# Patient Record
Sex: FEMALE | Race: WHITE | NOT HISPANIC OR LATINO
[De-identification: names, ages, dates, MRNs, and addresses within clinical notes are randomized per-mention and may not be internally consistent; named-entity substitution may affect disease eponyms.]

---

## 2020-04-23 PROBLEM — Z00.00 ENCOUNTER FOR PREVENTIVE HEALTH EXAMINATION: Status: ACTIVE | Noted: 2020-04-23

## 2020-04-28 ENCOUNTER — APPOINTMENT (OUTPATIENT)
Dept: VASCULAR SURGERY | Facility: CLINIC | Age: 80
End: 2020-04-28
Payer: MEDICARE

## 2020-04-28 ENCOUNTER — LABORATORY RESULT (OUTPATIENT)
Age: 80
End: 2020-04-28

## 2020-04-28 ENCOUNTER — OUTPATIENT (OUTPATIENT)
Dept: OUTPATIENT SERVICES | Facility: HOSPITAL | Age: 80
LOS: 1 days | End: 2020-04-28
Payer: COMMERCIAL

## 2020-04-28 DIAGNOSIS — Z01.818 ENCOUNTER FOR OTHER PREPROCEDURAL EXAMINATION: ICD-10-CM

## 2020-04-28 LAB
ALBUMIN SERPL ELPH-MCNC: 4.5 G/DL — SIGNIFICANT CHANGE UP (ref 3.3–5)
ALP SERPL-CCNC: 98 U/L — SIGNIFICANT CHANGE UP (ref 40–120)
ALT FLD-CCNC: 22 U/L — SIGNIFICANT CHANGE UP (ref 10–45)
ANION GAP SERPL CALC-SCNC: 12 MMOL/L — SIGNIFICANT CHANGE UP (ref 5–17)
APPEARANCE UR: CLEAR — SIGNIFICANT CHANGE UP
APTT BLD: 32 SEC — SIGNIFICANT CHANGE UP (ref 27.5–36.3)
AST SERPL-CCNC: 19 U/L — SIGNIFICANT CHANGE UP (ref 10–40)
BACTERIA # UR AUTO: SIGNIFICANT CHANGE UP /HPF
BASOPHILS # BLD AUTO: 0.11 K/UL — SIGNIFICANT CHANGE UP (ref 0–0.2)
BASOPHILS NFR BLD AUTO: 1.2 % — SIGNIFICANT CHANGE UP (ref 0–2)
BILIRUB SERPL-MCNC: 0.5 MG/DL — SIGNIFICANT CHANGE UP (ref 0.2–1.2)
BILIRUB UR-MCNC: NEGATIVE — SIGNIFICANT CHANGE UP
BUN SERPL-MCNC: 20 MG/DL — SIGNIFICANT CHANGE UP (ref 7–23)
CALCIUM SERPL-MCNC: 10.8 MG/DL — HIGH (ref 8.4–10.5)
CHLORIDE SERPL-SCNC: 106 MMOL/L — SIGNIFICANT CHANGE UP (ref 96–108)
CO2 SERPL-SCNC: 28 MMOL/L — SIGNIFICANT CHANGE UP (ref 22–31)
COLOR SPEC: YELLOW — SIGNIFICANT CHANGE UP
CREAT SERPL-MCNC: 0.78 MG/DL — SIGNIFICANT CHANGE UP (ref 0.5–1.3)
DIFF PNL FLD: NEGATIVE — SIGNIFICANT CHANGE UP
EOSINOPHIL # BLD AUTO: 0.26 K/UL — SIGNIFICANT CHANGE UP (ref 0–0.5)
EOSINOPHIL NFR BLD AUTO: 2.8 % — SIGNIFICANT CHANGE UP (ref 0–6)
EPI CELLS # UR: SIGNIFICANT CHANGE UP /HPF (ref 0–5)
GLUCOSE SERPL-MCNC: 104 MG/DL — HIGH (ref 70–99)
GLUCOSE UR QL: NEGATIVE — SIGNIFICANT CHANGE UP
HCT VFR BLD CALC: 45.7 % — HIGH (ref 34.5–45)
HGB BLD-MCNC: 14.1 G/DL — SIGNIFICANT CHANGE UP (ref 11.5–15.5)
IMM GRANULOCYTES NFR BLD AUTO: 0.3 % — SIGNIFICANT CHANGE UP (ref 0–1.5)
INR BLD: 1.03 — SIGNIFICANT CHANGE UP (ref 0.88–1.16)
KETONES UR-MCNC: NEGATIVE — SIGNIFICANT CHANGE UP
LEUKOCYTE ESTERASE UR-ACNC: ABNORMAL
LYMPHOCYTES # BLD AUTO: 2.21 K/UL — SIGNIFICANT CHANGE UP (ref 1–3.3)
LYMPHOCYTES # BLD AUTO: 23.5 % — SIGNIFICANT CHANGE UP (ref 13–44)
MCHC RBC-ENTMCNC: 29 PG — SIGNIFICANT CHANGE UP (ref 27–34)
MCHC RBC-ENTMCNC: 30.9 GM/DL — LOW (ref 32–36)
MCV RBC AUTO: 94 FL — SIGNIFICANT CHANGE UP (ref 80–100)
MONOCYTES # BLD AUTO: 0.79 K/UL — SIGNIFICANT CHANGE UP (ref 0–0.9)
MONOCYTES NFR BLD AUTO: 8.4 % — SIGNIFICANT CHANGE UP (ref 2–14)
NEUTROPHILS # BLD AUTO: 6.02 K/UL — SIGNIFICANT CHANGE UP (ref 1.8–7.4)
NEUTROPHILS NFR BLD AUTO: 63.8 % — SIGNIFICANT CHANGE UP (ref 43–77)
NITRITE UR-MCNC: NEGATIVE — SIGNIFICANT CHANGE UP
NRBC # BLD: 0 /100 WBCS — SIGNIFICANT CHANGE UP (ref 0–0)
PH UR: 6 — SIGNIFICANT CHANGE UP (ref 5–8)
PLATELET # BLD AUTO: 267 K/UL — SIGNIFICANT CHANGE UP (ref 150–400)
POTASSIUM SERPL-MCNC: 5.1 MMOL/L — SIGNIFICANT CHANGE UP (ref 3.5–5.3)
POTASSIUM SERPL-SCNC: 5.1 MMOL/L — SIGNIFICANT CHANGE UP (ref 3.5–5.3)
PROT SERPL-MCNC: 7.3 G/DL — SIGNIFICANT CHANGE UP (ref 6–8.3)
PROT UR-MCNC: NEGATIVE MG/DL — SIGNIFICANT CHANGE UP
PROTHROM AB SERPL-ACNC: 11.7 SEC — SIGNIFICANT CHANGE UP (ref 10–12.9)
RBC # BLD: 4.86 M/UL — SIGNIFICANT CHANGE UP (ref 3.8–5.2)
RBC # FLD: 13.1 % — SIGNIFICANT CHANGE UP (ref 10.3–14.5)
RBC CASTS # UR COMP ASSIST: < 5 /HPF — SIGNIFICANT CHANGE UP
SODIUM SERPL-SCNC: 146 MMOL/L — HIGH (ref 135–145)
SP GR SPEC: <=1.005 — SIGNIFICANT CHANGE UP (ref 1–1.03)
UROBILINOGEN FLD QL: 0.2 E.U./DL — SIGNIFICANT CHANGE UP
WBC # BLD: 9.42 K/UL — SIGNIFICANT CHANGE UP (ref 3.8–10.5)
WBC # FLD AUTO: 9.42 K/UL — SIGNIFICANT CHANGE UP (ref 3.8–10.5)
WBC UR QL: < 5 /HPF — SIGNIFICANT CHANGE UP

## 2020-04-28 PROCEDURE — 87086 URINE CULTURE/COLONY COUNT: CPT

## 2020-04-28 PROCEDURE — 85025 COMPLETE CBC W/AUTO DIFF WBC: CPT

## 2020-04-28 PROCEDURE — 93923 UPR/LXTR ART STDY 3+ LVLS: CPT

## 2020-04-28 PROCEDURE — 99205 OFFICE O/P NEW HI 60 MIN: CPT

## 2020-04-28 PROCEDURE — 93005 ELECTROCARDIOGRAM TRACING: CPT

## 2020-04-28 PROCEDURE — 81001 URINALYSIS AUTO W/SCOPE: CPT

## 2020-04-28 PROCEDURE — 80053 COMPREHEN METABOLIC PANEL: CPT

## 2020-04-28 PROCEDURE — 93010 ELECTROCARDIOGRAM REPORT: CPT

## 2020-04-28 PROCEDURE — 85730 THROMBOPLASTIN TIME PARTIAL: CPT

## 2020-04-28 PROCEDURE — 85610 PROTHROMBIN TIME: CPT

## 2020-04-28 NOTE — PHYSICAL EXAM
[2+] : right 2+ [JVD] : no jugular venous distention  [0] : left 0 [1+] : right 1+ [Alert] : alert [Calm] : calm [de-identified] : .5x.4cm ulcer on medial left heel.  0.3x0.3cm ulcer on plantar 5th toe [de-identified] : pleasant

## 2020-04-28 NOTE — REVIEW OF SYSTEMS
[Leg Claudication] : intermittent leg claudication [Skin Wound] : skin wound [Confused] : confusion [Negative] : Heme/Lymph

## 2020-04-28 NOTE — HISTORY OF PRESENT ILLNESS
[FreeTextEntry1] : pt comes in with her son for painful ulcer on the left heel\par the wound has been there for the past month and she was seen by surgeon in [place] who advised her to walk and no intervention .\par she also has a wound on the 5th toe on the left that has not been healing as well\par she is unable to walk and she is using the wheelchair\par \par no hx of DM,\par she is hypertensive\par

## 2020-04-28 NOTE — VITALS
[Stabbing] : stabbing [FreeTextEntry3] : left heel [FreeTextEntry1] : rest [FreeTextEntry2] : walking

## 2020-04-29 LAB
CULTURE RESULTS: SIGNIFICANT CHANGE UP
SPECIMEN SOURCE: SIGNIFICANT CHANGE UP

## 2020-05-01 VITALS
HEART RATE: 67 BPM | OXYGEN SATURATION: 95 % | TEMPERATURE: 98 F | DIASTOLIC BLOOD PRESSURE: 68 MMHG | RESPIRATION RATE: 16 BRPM | WEIGHT: 195.99 LBS | SYSTOLIC BLOOD PRESSURE: 103 MMHG

## 2020-05-02 ENCOUNTER — LABORATORY RESULT (OUTPATIENT)
Age: 80
End: 2020-05-02

## 2020-05-03 ENCOUNTER — FORM ENCOUNTER (OUTPATIENT)
Age: 80
End: 2020-05-03

## 2020-05-04 ENCOUNTER — OUTPATIENT (OUTPATIENT)
Dept: OUTPATIENT SERVICES | Facility: HOSPITAL | Age: 80
LOS: 1 days | Discharge: HOME | End: 2020-05-04
Payer: MEDICARE

## 2020-05-04 ENCOUNTER — APPOINTMENT (OUTPATIENT)
Dept: VASCULAR SURGERY | Facility: HOSPITAL | Age: 80
End: 2020-05-04

## 2020-05-04 VITALS — RESPIRATION RATE: 22 BRPM | OXYGEN SATURATION: 99 % | HEART RATE: 77 BPM

## 2020-05-04 DIAGNOSIS — S82.899A OTHER FRACTURE OF UNSPECIFIED LOWER LEG, INITIAL ENCOUNTER FOR CLOSED FRACTURE: Chronic | ICD-10-CM

## 2020-05-04 PROBLEM — I10 ESSENTIAL (PRIMARY) HYPERTENSION: Chronic | Status: ACTIVE | Noted: 2020-05-01

## 2020-05-04 PROBLEM — E07.9 DISORDER OF THYROID, UNSPECIFIED: Chronic | Status: ACTIVE | Noted: 2020-05-01

## 2020-05-04 PROBLEM — I73.9 PERIPHERAL VASCULAR DISEASE, UNSPECIFIED: Chronic | Status: ACTIVE | Noted: 2020-05-01

## 2020-05-04 PROCEDURE — 36247 INS CATH ABD/L-EXT ART 3RD: CPT | Mod: LT

## 2020-05-04 PROCEDURE — 75625 CONTRAST EXAM ABDOMINL AORTA: CPT | Mod: 26,GC

## 2020-05-04 PROCEDURE — 99204 OFFICE O/P NEW MOD 45 MIN: CPT

## 2020-05-04 PROCEDURE — C1769: CPT

## 2020-05-04 PROCEDURE — 36247 INS CATH ABD/L-EXT ART 3RD: CPT | Mod: GC

## 2020-05-04 PROCEDURE — C1887: CPT

## 2020-05-04 PROCEDURE — 93306 TTE W/DOPPLER COMPLETE: CPT | Mod: 26

## 2020-05-04 PROCEDURE — 76000 FLUOROSCOPY <1 HR PHYS/QHP: CPT

## 2020-05-04 PROCEDURE — 93970 EXTREMITY STUDY: CPT

## 2020-05-04 PROCEDURE — 93306 TTE W/DOPPLER COMPLETE: CPT

## 2020-05-04 PROCEDURE — 75710 ARTERY X-RAYS ARM/LEG: CPT | Mod: 26,GC

## 2020-05-04 PROCEDURE — 93970 EXTREMITY STUDY: CPT | Mod: 26

## 2020-05-04 PROCEDURE — C1894: CPT

## 2020-05-04 RX ORDER — SODIUM CHLORIDE 9 MG/ML
1000 INJECTION INTRAMUSCULAR; INTRAVENOUS; SUBCUTANEOUS
Refills: 0 | Status: DISCONTINUED | OUTPATIENT
Start: 2020-05-04 | End: 2020-05-04

## 2020-05-04 RX ORDER — ASPIRIN/CALCIUM CARB/MAGNESIUM 324 MG
81 TABLET ORAL DAILY
Refills: 0 | Status: DISCONTINUED | OUTPATIENT
Start: 2020-05-04 | End: 2020-05-04

## 2020-05-04 RX ADMIN — Medication 81 MILLIGRAM(S): at 13:11

## 2020-05-04 RX ADMIN — SODIUM CHLORIDE 90 MILLILITER(S): 9 INJECTION INTRAMUSCULAR; INTRAVENOUS; SUBCUTANEOUS at 09:07

## 2020-05-04 NOTE — PROGRESS NOTE ADULT - SUBJECTIVE AND OBJECTIVE BOX
Surgery Post-Op Note    Pre-Op Dx: PAD    Procedure: Angiogram, extremity, left    Surgeon: Dr. Fischer    Subjective: Examined at the bedside. Resting supine and flat. Denies cp, sob, LE pain or numbness. No acute complaints.      Vital Signs Last 24 Hrs  T(C): 36.1 (04 May 2020 08:58), Max: 36.1 (04 May 2020 08:58)  T(F): 97 (04 May 2020 08:58), Max: 97 (04 May 2020 08:58)  HR: 66 (04 May 2020 13:15) (53 - 66)  BP: 125/59 (04 May 2020 13:00) (107/53 - 125/59)  BP(mean): 85 (04 May 2020 13:00) (79 - 85)  RR: 20 (04 May 2020 13:15) (15 - 32)  SpO2: 100% (04 May 2020 13:15) (100% - 100%)    Physical Exam:  General: NAD, resting comfortably in bed  Pulmonary: Nonlabored breathing, no respiratory distress  Cardiovascular: NSR  Abdominal: soft, non-tender, non-distended  Extremities: WWP,   Groin: soft and flat, no palpable masses or hematoma noted  Neuro: no focal deficits, normal sensation  Pulses: LLE: DP monophasic, PT no signal, RLE-biphasic DP and PTsignals      LABS:            CAPILLARY BLOOD GLUCOSE                  Radiology and Additional Studies:

## 2020-05-04 NOTE — CONSULT NOTE ADULT - SUBJECTIVE AND OBJECTIVE BOX
Patient is an 79 yo Female with PMhx of PAD, HTN, admitted to the vascular service for evaluation and treatment of a Left Heal Ischemic Ulcer. Cardiology is being consulted for Pre-Op clearance prior to Scheduled Vascular LFSA Bypass surgery.    PAST MEDICAL & SURGICAL HISTORY:  Thyroid disease  HTN (hypertension)  PAD (peripheral artery disease): ischemic ulcer  Ankle fracture      Home Medications:  Olmesartan 40  HCTZ 12.5      MEDICATIONS  (STANDING):  aspirin  chewable 81 milliGRAM(s) Oral daily  sodium chloride 0.9%. 1000 milliLiter(s) (90 mL/Hr) IV Continuous <Continuous>    MEDICATIONS  (PRN):      .  VITAL SIGNS:  T(C): 36.1 (05-04-20 @ 08:58), Max: 36.1 (05-04-20 @ 08:58)  T(F): 97 (05-04-20 @ 08:58), Max: 97 (05-04-20 @ 08:58)  HR: 62 (05-04-20 @ 09:49) (53 - 62)  BP: 115/59 (05-04-20 @ 09:49) (107/53 - 120/58)  BP(mean): 84 (05-04-20 @ 09:49) (81 - 84)  RR: 20 (05-04-20 @ 09:49) (15 - 32)  SpO2: 100% (05-04-20 @ 09:49) (100% - 100%)  Wt(kg): --    PHYSICAL EXAM:   INCOMPLETE    Constitutional: WDWN resting comfortably in bed; NAD  Head: NC/AT  Eyes: PERRL, EOMI, anicteric sclera  ENT: no nasal discharge; uvula midline, no oropharyngeal erythema or exudates; MMM  Neck: supple; no JVD or thyromegaly  Respiratory: CTA B/L; no W/R/R, no retractions  Cardiac: +S1/S2; RRR; no M/R/G; PMI non-displaced  Gastrointestinal: soft, NT/ND; no rebound or guarding; +BSx4  Genitourinary: normal external genitalia  Back: spine midline, no bony tenderness or step-offs; no CVAT B/L  Extremities: WWP, no clubbing or cyanosis; no peripheral edema  Musculoskeletal: NROM x4; no joint swelling, tenderness or erythema  Vascular: 2+ radial, femoral, DP/PT pulses B/L  Dermatologic: skin warm, dry and intact; no rashes, wounds, or scars  Lymphatic: no submandibular or cervical LAD  Neurologic: AAOx3; CNII-XII grossly intact; no focal deficits  Psychiatric: affect and characteristics of appearance, verbalizations, behaviors are appropriate      .  .  LABS:         RADIOLOGY, EKG & ADDITIONAL TESTS: Reviewed.     < from: 12 Lead ECG (04.28.20 @ 12:50) >  Diagnosis Line Normal sinus rhythm  Possible anteroseptal wall MI  RSR' in V1    < end of copied text > Patient is an 79 yo Female with PMhx of PAD, HTN, admitted to the vascular service for evaluation and treatment of a Left Heal Ischemic Ulcer. Cardiology is being consulted for Pre-Op clearance prior to Scheduled Vascular LFSA Bypass surgery.    Patient reports that aside from her HTN she has no known medical history. Up until 3 years ago she and her  were able to go on 5 miles walks with friends and well has long bike rides. She states that she has excellent exercise tolerance and has never suffered from Angina, chest pain. SHe denies previous MI or cardiac workup by primary Physician. No echo or stress test.   Patient states that recently in the last month her ambulation began to diminish to due her ankle pain. She was evaluated and recommend to exercise therapy. Her pain got worse at [facility] point she sought a second opinion    PAST MEDICAL & SURGICAL HISTORY:  Thyroid disease  HTN (hypertension)  PAD (peripheral artery disease): ischemic ulcer  Ankle fracture      Home Medications:  Olmesartan 40  HCTZ 12.5      MEDICATIONS  (STANDING):  aspirin  chewable 81 milliGRAM(s) Oral daily  sodium chloride 0.9%. 1000 milliLiter(s) (90 mL/Hr) IV Continuous <Continuous>    MEDICATIONS  (PRN):      .  VITAL SIGNS:  T(C): 36.1 (05-04-20 @ 08:58), Max: 36.1 (05-04-20 @ 08:58)  T(F): 97 (05-04-20 @ 08:58), Max: 97 (05-04-20 @ 08:58)  HR: 62 (05-04-20 @ 09:49) (53 - 62)  BP: 115/59 (05-04-20 @ 09:49) (107/53 - 120/58)  BP(mean): 84 (05-04-20 @ 09:49) (81 - 84)  RR: 20 (05-04-20 @ 09:49) (15 - 32)  SpO2: 100% (05-04-20 @ 09:49) (100% - 100%)  Wt(kg): --    PHYSICAL EXAM:       Constitutional: WDWN resting comfortably in bed; NAD: About to be discharged  Head: NC/AT  Eyes: PERRL, EOMI, anicteric sclera  ENT:  MMM  Neck: supple; no JVD or thyromegaly  Respiratory: CTA B/L; no W/R/R  Cardiac: +S1/S2; RRR; no M/R/G;   Gastrointestinal: soft, NT/ND; no rebound or guarding; +BS  Extremities: WWP, no clubbing or cyanosis; no peripheral edema  Vascular: 2+ radial, femoral, DP/PT pulses B/L  Neurologic: AAOx3; CNII-XII grossly intact; no focal deficits      LABS:   -----      RADIOLOGY, EKG & ADDITIONAL TESTS: Reviewed.     < from: 12 Lead ECG (04.28.20 @ 12:50) >  Diagnosis Line Normal sinus rhythm  Possible anteroseptal wall MI  RSR' in V1    < end of copied text >      < from: TTE Echo Complete w/o contrast w/ Doppler (05.04.20 @ 11:31) >  . Normal left and right ventricular size and systolic function.   2. Grade II left ventricular diastolic dysfunction with elevated filling pressure.   3. Mild symmetric left ventricular hypertrophy.   4. Aorticsclerosis without significant stenosis.   5. Pulmonary hypertension present, pulmonary artery systolic pressure is 37 mmHg.   6. No pericardial effusion.    < end of copied text > Patient is an 79 yo Female with PMhx of PAD, HTN, admitted to the vascular service for evaluation and treatment of a Left Heal Ischemic Ulcer. Cardiology is being consulted for Pre-Op clearance prior to Scheduled Vascular LFSA Bypass surgery.    Patient reports that aside from her HTN she has no known medical history. Up until 3 years ago she and her  were able to go on 5 miles walks with friends and well has long bike rides. She states that she has excellent exercise tolerance and has never suffered from Angina, chest pain. SHe denies previous MI or cardiac workup by primary Physician. No echo or stress test.   Patient states that recently in the last month her ambulation began to diminish to due her ankle pain. She was evaluated and recommend to exercise therapy. Her pain got worse at [facility] point she sought a second opinion    PAST MEDICAL & SURGICAL HISTORY:  Thyroid disease  HTN (hypertension)  PAD (peripheral artery disease): ischemic ulcer  Ankle fracture      Home Medications:  Olmesartan 40  HCTZ 12.5      MEDICATIONS  (STANDING):  aspirin  chewable 81 milliGRAM(s) Oral daily  sodium chloride 0.9%. 1000 milliLiter(s) (90 mL/Hr) IV Continuous <Continuous>    MEDICATIONS  (PRN):      .  VITAL SIGNS:  T(C): 36.1 (05-04-20 @ 08:58), Max: 36.1 (05-04-20 @ 08:58)  T(F): 97 (05-04-20 @ 08:58), Max: 97 (05-04-20 @ 08:58)  HR: 62 (05-04-20 @ 09:49) (53 - 62)  BP: 115/59 (05-04-20 @ 09:49) (107/53 - 120/58)  BP(mean): 84 (05-04-20 @ 09:49) (81 - 84)  RR: 20 (05-04-20 @ 09:49) (15 - 32)  SpO2: 100% (05-04-20 @ 09:49) (100% - 100%)  Wt(kg): --    PHYSICAL EXAM:       Constitutional: WDWN resting comfortably in bed; NAD: About to be discharged  Head: NC/AT  Eyes: PERRL, EOMI, anicteric sclera  ENT:  MMM  Neck: supple; no JVD or thyromegaly  Respiratory: CTA B/L; no W/R/R  Cardiac: +S1/S2; RRR; no M/R/G;   Gastrointestinal: soft, NT/ND; no rebound or guarding; +BS  Extremities: WWP, no peripheral edema on the RLE with preserved DP/PT pulses. LLE wrapped   Vascular: 2+ radial,        LABS:   -----      RADIOLOGY, EKG & ADDITIONAL TESTS: Reviewed.     < from: 12 Lead ECG (04.28.20 @ 12:50) >  Diagnosis Line Normal sinus rhythm  Possible anteroseptal wall MI  RSR' in V1    < end of copied text >      < from: TTE Echo Complete w/o contrast w/ Doppler (05.04.20 @ 11:31) >  . Normal left and right ventricular size and systolic function.   2. Grade II left ventricular diastolic dysfunction with elevated filling pressure.   3. Mild symmetric left ventricular hypertrophy.   4. Aorticsclerosis without significant stenosis.   5. Pulmonary hypertension present, pulmonary artery systolic pressure is 37 mmHg.   6. No pericardial effusion.    < end of copied text >

## 2020-05-04 NOTE — BRIEF OPERATIVE NOTE - OPERATION/FINDINGS
Diagnostic left lower extremity angiogram:  - 5F sheath access into R CFA  - aortogram showed patent aorta and patent b/l EZRA/IIA/EIA  - catheterized left EIA with omniflush  - LLE angio showed patent CFA/PFA, proximal/mid SFA patent, distal SFA/AK pop occluded, BK pop with mild atherosclerotic disease  - proximal L AT/TP trunk/peroneal/PT arteries severely diseased, all occluded shortly after origin. AT reconstitutes in distal lower leg into DP artery  - diagnostic procedure only, no intervention  - 80 cc contrast

## 2020-05-04 NOTE — PROGRESS NOTE ADULT - ASSESSMENT
Assessment: 80y woman s/p Angiogram.    Plan:  Pain/nausea control PRN  Diet: Regular /IVF  Home meds  Incentive spirometer/OOB/Ambulate  Discharge after cardiology clearance  Return for OR on Thursday

## 2020-05-04 NOTE — CONSULT NOTE ADULT - ASSESSMENT
79 yo Female with PMhx of PAD, HTN, admitted to the vascular service for evaluation and treatment of a Left Heal Ischemic Ulcer. Cardiology is being consulted for Pre-Op clearance prior to Scheduled Vascular LFSA Bypass surgery      1) Pre-OP Clearance  -Patient with HTN and newly Atherosclerotic Cardiovascular Disease (PAD) with left heal ischemic Ulcer  -She has no known Ischemic Heart Disease, Hx of Congestive Heart Failure, CVD, DM. Outpatient Cr is normal   -EKG with Septal Q waves, cannot rule out prior Septal Infarct, without evidence of Ischemic changes otherwise,  Echo this hospitalization without any wall motion abnormalities, Grade II left ventricular diastolic dysfunction with elevated filling pressure and mild LVH  -Recommend sending Lipid profile, TSH and A1C  -Patient would benefit from High dose statin given PAD (Lipitor 40 Given age).   -Would recommend Initiation of Aspirin 81 mg given PAD.  -Can continue with Olmesartan 40 mg (equivalent of Losartan 100 mg) and Hctz 12.5 PO.   -Given planned surgery will involved contrast, would advise to Hold Olmesartan 24 hour prior to intervention. Should BP control be required at the time would recommend Norvasc  -Patient's RCRI score is a class II, with a 6%-30 day risk of Death, MI or Cardiac Arrest  -No further cardiac testing is needed prior to urgent LLE BYpass vascular surgery  -Please call cardiology with any questions

## 2020-05-04 NOTE — BRIEF OPERATIVE NOTE - COMMENTS
At end of case, sheath removed from R groin, and hemostasis achieved after 20 minutes of manual compression. Groin soft, no hematoma.  Strict flat bedrest until 1 pm.  Post op: get echo, vein map, cards consult for clearance before LLE bypass

## 2020-05-06 ENCOUNTER — LABORATORY RESULT (OUTPATIENT)
Age: 80
End: 2020-05-06

## 2020-05-06 ENCOUNTER — TRANSCRIPTION ENCOUNTER (OUTPATIENT)
Age: 80
End: 2020-05-06

## 2020-05-06 VITALS
OXYGEN SATURATION: 96 % | DIASTOLIC BLOOD PRESSURE: 77 MMHG | WEIGHT: 196.87 LBS | HEIGHT: 63 IN | RESPIRATION RATE: 16 BRPM | HEART RATE: 80 BPM | SYSTOLIC BLOOD PRESSURE: 123 MMHG | TEMPERATURE: 98 F

## 2020-05-06 NOTE — PATIENT PROFILE ADULT - DISASTER - NSPROIMPLANTSMEDDEV_GEN_A_NUR
Increase fluids  NSAIDs prn  Heating pad prn    RTC if pain worsening or in 1-2 weeks for OMT   None

## 2020-05-07 ENCOUNTER — INPATIENT (INPATIENT)
Facility: HOSPITAL | Age: 80
LOS: 3 days | Discharge: NURSING, REHAB OR LONG-TERM CARE | DRG: 253 | End: 2020-05-11
Attending: SURGERY | Admitting: SURGERY
Payer: MEDICARE

## 2020-05-07 ENCOUNTER — APPOINTMENT (OUTPATIENT)
Dept: VASCULAR SURGERY | Facility: HOSPITAL | Age: 80
End: 2020-05-07

## 2020-05-07 DIAGNOSIS — S82.899A OTHER FRACTURE OF UNSPECIFIED LOWER LEG, INITIAL ENCOUNTER FOR CLOSED FRACTURE: Chronic | ICD-10-CM

## 2020-05-07 LAB
ANION GAP SERPL CALC-SCNC: 13 MMOL/L — SIGNIFICANT CHANGE UP (ref 5–17)
APTT BLD: 31.2 SEC — SIGNIFICANT CHANGE UP (ref 27.5–36.3)
BUN SERPL-MCNC: 17 MG/DL — SIGNIFICANT CHANGE UP (ref 7–23)
CALCIUM SERPL-MCNC: 9.5 MG/DL — SIGNIFICANT CHANGE UP (ref 8.4–10.5)
CHLORIDE SERPL-SCNC: 109 MMOL/L — HIGH (ref 96–108)
CO2 SERPL-SCNC: 21 MMOL/L — LOW (ref 22–31)
CREAT SERPL-MCNC: 0.62 MG/DL — SIGNIFICANT CHANGE UP (ref 0.5–1.3)
GLUCOSE SERPL-MCNC: 123 MG/DL — HIGH (ref 70–99)
HCT VFR BLD CALC: 36.1 % — SIGNIFICANT CHANGE UP (ref 34.5–45)
HGB BLD-MCNC: 11.8 G/DL — SIGNIFICANT CHANGE UP (ref 11.5–15.5)
INR BLD: 1.08 — SIGNIFICANT CHANGE UP (ref 0.88–1.16)
MAGNESIUM SERPL-MCNC: 2.2 MG/DL — SIGNIFICANT CHANGE UP (ref 1.6–2.6)
MCHC RBC-ENTMCNC: 29.6 PG — SIGNIFICANT CHANGE UP (ref 27–34)
MCHC RBC-ENTMCNC: 32.7 GM/DL — SIGNIFICANT CHANGE UP (ref 32–36)
MCV RBC AUTO: 90.7 FL — SIGNIFICANT CHANGE UP (ref 80–100)
NRBC # BLD: 0 /100 WBCS — SIGNIFICANT CHANGE UP (ref 0–0)
PHOSPHATE SERPL-MCNC: 3.9 MG/DL — SIGNIFICANT CHANGE UP (ref 2.5–4.5)
PLATELET # BLD AUTO: 249 K/UL — SIGNIFICANT CHANGE UP (ref 150–400)
POTASSIUM SERPL-MCNC: 3.9 MMOL/L — SIGNIFICANT CHANGE UP (ref 3.5–5.3)
POTASSIUM SERPL-SCNC: 3.9 MMOL/L — SIGNIFICANT CHANGE UP (ref 3.5–5.3)
PROTHROM AB SERPL-ACNC: 12.3 SEC — SIGNIFICANT CHANGE UP (ref 10–12.9)
RBC # BLD: 3.98 M/UL — SIGNIFICANT CHANGE UP (ref 3.8–5.2)
RBC # FLD: 13.2 % — SIGNIFICANT CHANGE UP (ref 10.3–14.5)
SARS-COV-2 RNA SPEC QL NAA+PROBE: SIGNIFICANT CHANGE UP
SODIUM SERPL-SCNC: 143 MMOL/L — SIGNIFICANT CHANGE UP (ref 135–145)
WBC # BLD: 11.86 K/UL — HIGH (ref 3.8–10.5)
WBC # FLD AUTO: 11.86 K/UL — HIGH (ref 3.8–10.5)

## 2020-05-07 PROCEDURE — 35571 ART BYP POP-TIBL-PRL-OTHER: CPT | Mod: 78,GC

## 2020-05-07 PROCEDURE — 37226: CPT | Mod: 78,GC

## 2020-05-07 RX ORDER — CEFAZOLIN SODIUM 1 G
2000 VIAL (EA) INJECTION EVERY 8 HOURS
Refills: 0 | Status: COMPLETED | OUTPATIENT
Start: 2020-05-07 | End: 2020-05-08

## 2020-05-07 RX ORDER — HYDROMORPHONE HYDROCHLORIDE 2 MG/ML
1 INJECTION INTRAMUSCULAR; INTRAVENOUS; SUBCUTANEOUS EVERY 6 HOURS
Refills: 0 | Status: DISCONTINUED | OUTPATIENT
Start: 2020-05-07 | End: 2020-05-10

## 2020-05-07 RX ORDER — OXYCODONE HYDROCHLORIDE 5 MG/1
5 TABLET ORAL ONCE
Refills: 0 | Status: DISCONTINUED | OUTPATIENT
Start: 2020-05-07 | End: 2020-05-07

## 2020-05-07 RX ORDER — ACETAMINOPHEN 500 MG
325 TABLET ORAL ONCE
Refills: 0 | Status: COMPLETED | OUTPATIENT
Start: 2020-05-07 | End: 2020-05-07

## 2020-05-07 RX ORDER — OXYCODONE AND ACETAMINOPHEN 5; 325 MG/1; MG/1
1 TABLET ORAL EVERY 4 HOURS
Refills: 0 | Status: DISCONTINUED | OUTPATIENT
Start: 2020-05-07 | End: 2020-05-07

## 2020-05-07 RX ORDER — ASPIRIN/CALCIUM CARB/MAGNESIUM 324 MG
1 TABLET ORAL
Qty: 0 | Refills: 0 | DISCHARGE

## 2020-05-07 RX ORDER — ATORVASTATIN CALCIUM 80 MG/1
40 TABLET, FILM COATED ORAL AT BEDTIME
Refills: 0 | Status: DISCONTINUED | OUTPATIENT
Start: 2020-05-07 | End: 2020-05-11

## 2020-05-07 RX ORDER — POTASSIUM CHLORIDE 20 MEQ
20 PACKET (EA) ORAL ONCE
Refills: 0 | Status: COMPLETED | OUTPATIENT
Start: 2020-05-07 | End: 2020-05-07

## 2020-05-07 RX ORDER — ACETAMINOPHEN 500 MG
650 TABLET ORAL EVERY 6 HOURS
Refills: 0 | Status: DISCONTINUED | OUTPATIENT
Start: 2020-05-07 | End: 2020-05-10

## 2020-05-07 RX ORDER — SODIUM CHLORIDE 9 MG/ML
1000 INJECTION INTRAMUSCULAR; INTRAVENOUS; SUBCUTANEOUS
Refills: 0 | Status: DISCONTINUED | OUTPATIENT
Start: 2020-05-07 | End: 2020-05-08

## 2020-05-07 RX ORDER — HYDROMORPHONE HYDROCHLORIDE 2 MG/ML
0.5 INJECTION INTRAMUSCULAR; INTRAVENOUS; SUBCUTANEOUS
Refills: 0 | Status: DISCONTINUED | OUTPATIENT
Start: 2020-05-07 | End: 2020-05-10

## 2020-05-07 RX ORDER — ASPIRIN/CALCIUM CARB/MAGNESIUM 324 MG
81 TABLET ORAL DAILY
Refills: 0 | Status: DISCONTINUED | OUTPATIENT
Start: 2020-05-07 | End: 2020-05-11

## 2020-05-07 RX ORDER — ASPIRIN/CALCIUM CARB/MAGNESIUM 324 MG
81 TABLET ORAL DAILY
Refills: 0 | Status: DISCONTINUED | OUTPATIENT
Start: 2020-05-07 | End: 2020-05-07

## 2020-05-07 RX ORDER — HYDROMORPHONE HYDROCHLORIDE 2 MG/ML
0.5 INJECTION INTRAMUSCULAR; INTRAVENOUS; SUBCUTANEOUS
Refills: 0 | Status: DISCONTINUED | OUTPATIENT
Start: 2020-05-07 | End: 2020-05-11

## 2020-05-07 RX ORDER — OLMESARTAN MEDOXOMIL-HYDROCHLOROTHIAZIDE 25; 40 MG/1; MG/1
1 TABLET, FILM COATED ORAL
Qty: 0 | Refills: 0 | DISCHARGE

## 2020-05-07 RX ORDER — ACETAMINOPHEN 500 MG
650 TABLET ORAL EVERY 6 HOURS
Refills: 0 | Status: DISCONTINUED | OUTPATIENT
Start: 2020-05-07 | End: 2020-05-07

## 2020-05-07 RX ADMIN — ATORVASTATIN CALCIUM 40 MILLIGRAM(S): 80 TABLET, FILM COATED ORAL at 21:18

## 2020-05-07 RX ADMIN — Medication 100 MILLIGRAM(S): at 21:18

## 2020-05-07 RX ADMIN — Medication 81 MILLIGRAM(S): at 14:17

## 2020-05-07 RX ADMIN — Medication 20 MILLIEQUIVALENT(S): at 16:26

## 2020-05-07 RX ADMIN — SODIUM CHLORIDE 90 MILLILITER(S): 9 INJECTION INTRAMUSCULAR; INTRAVENOUS; SUBCUTANEOUS at 14:18

## 2020-05-07 RX ADMIN — OXYCODONE HYDROCHLORIDE 5 MILLIGRAM(S): 5 TABLET ORAL at 15:23

## 2020-05-07 RX ADMIN — OXYCODONE AND ACETAMINOPHEN 1 TABLET(S): 5; 325 TABLET ORAL at 14:27

## 2020-05-07 RX ADMIN — Medication 325 MILLIGRAM(S): at 15:22

## 2020-05-07 RX ADMIN — HYDROMORPHONE HYDROCHLORIDE 0.5 MILLIGRAM(S): 2 INJECTION INTRAMUSCULAR; INTRAVENOUS; SUBCUTANEOUS at 15:35

## 2020-05-07 NOTE — BRIEF OPERATIVE NOTE - NSICDXBRIEFPREOP_GEN_ALL_CORE_FT
PRE-OP DIAGNOSIS:  PAD (peripheral artery disease) 07-May-2020 14:22:08 with tissue loss Carlos Baum

## 2020-05-07 NOTE — BRIEF OPERATIVE NOTE - NSICDXBRIEFPROCEDURE_GEN_ALL_CORE_FT
PROCEDURES:  Vascular surgery 07-May-2020 14:26:42  Carlos Baum PROCEDURES:  Vascular surgery 07-May-2020 14:26:42 left SFA angioplasty/stent, left BK pop to DP bypass with Carlos Stubbs

## 2020-05-07 NOTE — PROGRESS NOTE ADULT - SUBJECTIVE AND OBJECTIVE BOX
Surgery Post-Op Note    Pre-Op Dx:   Procedure: BK pop to DP bypass and distal SFA and pop angioplasty/stent    Surgeon: Dr. Fischer    Subjective: Examined at the bedside. States she has left foot numbness after ankle block. Denies numbness and tingling of the R leg and foot, sob, cp, nausea, emesis. no acute complaints      Vital Signs Last 24 Hrs  T(C): 36.3 (07 May 2020 16:49), Max: 36.7 (07 May 2020 15:30)  T(F): 97.4 (07 May 2020 16:49), Max: 98 (07 May 2020 15:30)  HR: 71 (07 May 2020 16:49) (54 - 71)  BP: 120/92 (07 May 2020 16:49) (99/56 - 134/66)  BP(mean): 94 (07 May 2020 16:00) (73 - 94)  RR: 16 (07 May 2020 16:49) (14 - 23)  SpO2: 100% (07 May 2020 16:49) (99% - 100%)    Physical Exam:  General: NAD, resting comfortably in bed  Pulmonary: Nonlabored breathing, no respiratory distress  Cardiovascular: NSR  Abdominal: soft, non-tender, non-distended  Extremities: WWP, medial thigh dressing clean, non saturated, left foot dressing clean and dry  Neuro: no focal deficits, normal sensation of RLE, numbness of left foot  Pulses: LLE: palpable DP pulse, RLE: biphasic DP/PT pulses      LABS:                        11.8   11.86 )-----------( 249      ( 07 May 2020 14:21 )             36.1     05-07    143  |  109<H>  |  17  ----------------------------<  123<H>  3.9   |  21<L>  |  0.62    Ca    9.5      07 May 2020 14:21  Phos  3.9     05-07  Mg     2.2     05-07      PT/INR - ( 07 May 2020 14:21 )   PT: 12.3 sec;   INR: 1.08          PTT - ( 07 May 2020 14:21 )  PTT:31.2 sec  CAPILLARY BLOOD GLUCOSE            ABO Interpretation: B (05-07 @ 10:00)        Radiology and Additional Studies:    < from: US Duplex Venous Lower Ext Complete, Bilateral (05.04.20 @ 11:03) >  FINDINGS: Duplex Doppler evaluation utilizing grayscale imaging, color flow Doppler imaging, and spectral analysis of the veins of the bilaterallower extremity demonstrate no visible thrombus from the common femoral vein to the  popliteal vein. The posterior tibial and peroneal veins  are normal in appearance.   MPRESSION:   No superficial or deep venous thrombosis seen.  Lower extremity vein mapping with measurements reported in detail as above    < end of copied text >

## 2020-05-07 NOTE — BRIEF OPERATIVE NOTE - OPERATION/FINDINGS
Spinal block performed by anesthesia. Pt then placed supine.    Harvested left greater saphenous vein in an attempt to perform left femoral artery to DP artery bypass. However, only GSV in the thigh was of adequate caliber. This portion of GSV was excised and prepared.     Below-knee popliteal artery then exposed. Accessed this artery with 4F sheath then performed LLE angiogram via retrograde approach, which showed known distal SFA/above-knee popliteal occlusion with diffuse disease in anterior tibial artery (main runoff to foot). Upsized to 8F sheath. Distal SFA/above-knee popliteal artery treated with Viabahn 6 mm x 10 cm covered stent and Viabahn 7 mm x 10 cm covered stent. These stents were post-dilated with [place] 6 mm x 200 mm balloon. Completion angiogram showed resolute of prior distal SFA/AK pop occlusion with filling of below-knee popliteal artery.    We then reversed the harvested GSV and performed left below-knee popliteal to dorsalis pedis artery bypass. Completion angio showed patent anastomoses, and there was a palpable DP pulse in distal left foot at end of the case.    Hemostasis achieved, and incisions closed primarily with Nylon sutures to DP harvest site and nylons/staples to rest of all incisions. After sterile dressings applied to incisions, left heel/5th toe wounds cleansed with betadine and dressed with kerlix. Spot within center of kerlix left open to palpate left DP pulse.    Contrast: 28 cc

## 2020-05-07 NOTE — H&P ADULT - NSICDXPASTMEDICALHX_GEN_ALL_CORE_FT
PAST MEDICAL HISTORY:  HTN (hypertension)     PAD (peripheral artery disease) ischemic ulcer    Thyroid disease

## 2020-05-07 NOTE — BRIEF OPERATIVE NOTE - NSICDXBRIEFPOSTOP_GEN_ALL_CORE_FT
POST-OP DIAGNOSIS:  PAD (peripheral artery disease) 07-May-2020 14:22:39 with tissue loss Carlos Baum

## 2020-05-07 NOTE — H&P ADULT - HISTORY OF PRESENT ILLNESS
80y female here for elective LLE fem-distal bypass for CLI.   PMH HTN, PAD presenting earlier this week with painful ulceration on the left heel and 5th toe x1 month. She is currently non-ambulatory and uses wheelchair. She lives in [place] with her  but will be staying with her son in [place].     5/4: patient underwent diagnostic only, LLE angiogram via right groin access showing distal SFA/AK pop occluded, BK pop with mild atherosclerotic disease  - proximal L AT/TP trunk/peroneal/PT arteries severely diseased, all occluded shortly after origin. AT reconstitutes in distal lower leg into DP artery  L DANIELLE: 0.26  R DANIELLE: 0.80    Echo (5/4/2020): normal EF  Pre-Op Hgb: 14.1.   Pre-Op Cr: 0.78    PAST MEDICAL & SURGICAL HISTORY:  Thyroid disease  HTN (hypertension)  PAD (peripheral artery disease): ischemic ulcer  Ankle fracture      ROS: See HPI    MEDICATIONS:  Benicar 40/12.5  Aspirin      No Known Allergies    Intolerances        SOCIAL HISTORY:  Smoke: Never Smoker  EtOH: occasional    FAMILY HISTORY:      Vital Signs Last 24 Hrs  HR 80, Resp 16, /77, Temp 97.7.     PHYSICAL EXAM  Exam:   Neuro: AOX3, calm, NAD.   Gen: WD, WN, appropriately groomed.    HEENT: AT, NC  Neck: No JVD, Normal thyroid, NO carotid bruits bilaterally.   Res: Nml BS, CTAB.   CV: Normal HS, RRR, no MRG  Pulses:                     R:      L:   Brachial: 2+ / 2+  Radial:    2+ / 2+    Ankle Edema: No  Varicose Veins: No  Venous Stasis: No  Wounds: left foot - 0.5x0.5cm medial heel wound, dry necrotic with no drainage, minimal surrounding erythema. 0.25x0.25cm dry shallow necrotic ulceration on the lateral aspect of the 5th toe.   Abd: soft, ND, NT, no masses or organomegaly appreciated  Musculoskeletal: sensation grossly intact, strength 5/5, FROM bilaterally           Assessment and Plan:  80yFemale admitted electively for left lower extremity fem-distal bypass for CLI and non-healing left heel and 5th toe wound, DANIELLE 0.26    Admit to Vascular Surgery, telemetry   Pain, nausea control   NPO/IVF, advance as tolerated   continue home medications, aspirin (last taken 1 week ago)   DVT ppx  AM labs   PT eval postop  EF normal (5/4/20)  Preop Hgb 14.1, Cr 0.78

## 2020-05-08 ENCOUNTER — TRANSCRIPTION ENCOUNTER (OUTPATIENT)
Age: 80
End: 2020-05-08

## 2020-05-08 LAB
ALBUMIN SERPL ELPH-MCNC: 3.3 G/DL — SIGNIFICANT CHANGE UP (ref 3.3–5)
ALP SERPL-CCNC: 63 U/L — SIGNIFICANT CHANGE UP (ref 40–120)
ALT FLD-CCNC: 13 U/L — SIGNIFICANT CHANGE UP (ref 10–45)
ANION GAP SERPL CALC-SCNC: 12 MMOL/L — SIGNIFICANT CHANGE UP (ref 5–17)
AST SERPL-CCNC: 17 U/L — SIGNIFICANT CHANGE UP (ref 10–40)
BASOPHILS # BLD AUTO: 0.05 K/UL — SIGNIFICANT CHANGE UP (ref 0–0.2)
BASOPHILS NFR BLD AUTO: 0.5 % — SIGNIFICANT CHANGE UP (ref 0–2)
BILIRUB SERPL-MCNC: 0.5 MG/DL — SIGNIFICANT CHANGE UP (ref 0.2–1.2)
BUN SERPL-MCNC: 16 MG/DL — SIGNIFICANT CHANGE UP (ref 7–23)
CALCIUM SERPL-MCNC: 8.7 MG/DL — SIGNIFICANT CHANGE UP (ref 8.4–10.5)
CHLORIDE SERPL-SCNC: 107 MMOL/L — SIGNIFICANT CHANGE UP (ref 96–108)
CO2 SERPL-SCNC: 22 MMOL/L — SIGNIFICANT CHANGE UP (ref 22–31)
CREAT SERPL-MCNC: 0.68 MG/DL — SIGNIFICANT CHANGE UP (ref 0.5–1.3)
EOSINOPHIL # BLD AUTO: 0.1 K/UL — SIGNIFICANT CHANGE UP (ref 0–0.5)
EOSINOPHIL NFR BLD AUTO: 0.9 % — SIGNIFICANT CHANGE UP (ref 0–6)
GLUCOSE SERPL-MCNC: 122 MG/DL — HIGH (ref 70–99)
HCT VFR BLD CALC: 33.1 % — LOW (ref 34.5–45)
HGB BLD-MCNC: 10.5 G/DL — LOW (ref 11.5–15.5)
IMM GRANULOCYTES NFR BLD AUTO: 0.5 % — SIGNIFICANT CHANGE UP (ref 0–1.5)
LYMPHOCYTES # BLD AUTO: 1.65 K/UL — SIGNIFICANT CHANGE UP (ref 1–3.3)
LYMPHOCYTES # BLD AUTO: 15.2 % — SIGNIFICANT CHANGE UP (ref 13–44)
MAGNESIUM SERPL-MCNC: 1.9 MG/DL — SIGNIFICANT CHANGE UP (ref 1.6–2.6)
MCHC RBC-ENTMCNC: 29.7 PG — SIGNIFICANT CHANGE UP (ref 27–34)
MCHC RBC-ENTMCNC: 31.7 GM/DL — LOW (ref 32–36)
MCV RBC AUTO: 93.8 FL — SIGNIFICANT CHANGE UP (ref 80–100)
MONOCYTES # BLD AUTO: 1 K/UL — HIGH (ref 0–0.9)
MONOCYTES NFR BLD AUTO: 9.2 % — SIGNIFICANT CHANGE UP (ref 2–14)
NEUTROPHILS # BLD AUTO: 8.02 K/UL — HIGH (ref 1.8–7.4)
NEUTROPHILS NFR BLD AUTO: 73.7 % — SIGNIFICANT CHANGE UP (ref 43–77)
NRBC # BLD: 0 /100 WBCS — SIGNIFICANT CHANGE UP (ref 0–0)
PHOSPHATE SERPL-MCNC: 2.8 MG/DL — SIGNIFICANT CHANGE UP (ref 2.5–4.5)
PLATELET # BLD AUTO: 220 K/UL — SIGNIFICANT CHANGE UP (ref 150–400)
POTASSIUM SERPL-MCNC: 4.2 MMOL/L — SIGNIFICANT CHANGE UP (ref 3.5–5.3)
POTASSIUM SERPL-SCNC: 4.2 MMOL/L — SIGNIFICANT CHANGE UP (ref 3.5–5.3)
PROT SERPL-MCNC: 5.7 G/DL — LOW (ref 6–8.3)
RBC # BLD: 3.53 M/UL — LOW (ref 3.8–5.2)
RBC # FLD: 13.3 % — SIGNIFICANT CHANGE UP (ref 10.3–14.5)
SODIUM SERPL-SCNC: 141 MMOL/L — SIGNIFICANT CHANGE UP (ref 135–145)
WBC # BLD: 10.87 K/UL — HIGH (ref 3.8–10.5)
WBC # FLD AUTO: 10.87 K/UL — HIGH (ref 3.8–10.5)

## 2020-05-08 RX ORDER — HEPARIN SODIUM 5000 [USP'U]/ML
5000 INJECTION INTRAVENOUS; SUBCUTANEOUS EVERY 8 HOURS
Refills: 0 | Status: DISCONTINUED | OUTPATIENT
Start: 2020-05-08 | End: 2020-05-09

## 2020-05-08 RX ORDER — MAGNESIUM SULFATE 500 MG/ML
1 VIAL (ML) INJECTION ONCE
Refills: 0 | Status: COMPLETED | OUTPATIENT
Start: 2020-05-08 | End: 2020-05-08

## 2020-05-08 RX ORDER — CLOPIDOGREL BISULFATE 75 MG/1
75 TABLET, FILM COATED ORAL DAILY
Refills: 0 | Status: DISCONTINUED | OUTPATIENT
Start: 2020-05-08 | End: 2020-05-11

## 2020-05-08 RX ADMIN — HEPARIN SODIUM 5000 UNIT(S): 5000 INJECTION INTRAVENOUS; SUBCUTANEOUS at 13:08

## 2020-05-08 RX ADMIN — Medication 100 MILLIGRAM(S): at 05:54

## 2020-05-08 RX ADMIN — Medication 81 MILLIGRAM(S): at 11:05

## 2020-05-08 RX ADMIN — ATORVASTATIN CALCIUM 40 MILLIGRAM(S): 80 TABLET, FILM COATED ORAL at 22:10

## 2020-05-08 RX ADMIN — HYDROMORPHONE HYDROCHLORIDE 1 MILLIGRAM(S): 2 INJECTION INTRAMUSCULAR; INTRAVENOUS; SUBCUTANEOUS at 13:09

## 2020-05-08 RX ADMIN — Medication 650 MILLIGRAM(S): at 04:13

## 2020-05-08 RX ADMIN — HYDROMORPHONE HYDROCHLORIDE 1 MILLIGRAM(S): 2 INJECTION INTRAMUSCULAR; INTRAVENOUS; SUBCUTANEOUS at 22:10

## 2020-05-08 RX ADMIN — HYDROMORPHONE HYDROCHLORIDE 0.5 MILLIGRAM(S): 2 INJECTION INTRAMUSCULAR; INTRAVENOUS; SUBCUTANEOUS at 11:45

## 2020-05-08 RX ADMIN — Medication 100 MILLIGRAM(S): at 13:08

## 2020-05-08 RX ADMIN — CLOPIDOGREL BISULFATE 75 MILLIGRAM(S): 75 TABLET, FILM COATED ORAL at 08:41

## 2020-05-08 RX ADMIN — Medication 100 GRAM(S): at 08:41

## 2020-05-08 NOTE — PHYSICAL THERAPY INITIAL EVALUATION ADULT - PHYSICAL ASSIST/NONPHYSICAL ASSIST: SIT/SUPINE, REHAB EVAL
verbal cues/nonverbal cues (demo/gestures)/2 person assist/decreased eccentric trunk control; increased assist for B/L LE onto bed surface (L>R)

## 2020-05-08 NOTE — PHYSICAL THERAPY INITIAL EVALUATION ADULT - PHYSICAL ASSIST/NONPHYSICAL ASSIST: SUPINE/SIT, REHAB EVAL
verbal cues/nonverbal cues (demo/gestures)/1 person assist/able to bring (R)LE to EOB with VCs; total assist for (L)LE off of bed surface and controlled descent against gravity in sitting with therapist assist; increased assist for trunk mobility

## 2020-05-08 NOTE — PHYSICAL THERAPY INITIAL EVALUATION ADULT - LEVEL OF INDEPENDENCE: GAIT, REHAB EVAL
Attempted weight-shifting through (L)forefoot however patient unable to perform despite max encouragement and 2-3 attempts 2/2 pain

## 2020-05-08 NOTE — PHYSICAL THERAPY INITIAL EVALUATION ADULT - PERTINENT HX OF CURRENT PROBLEM, REHAB EVAL
80y female here for elective LLE fem-distal bypass for CLI. PMH HTN, PAD presenting earlier this week with painful ulceration on the left heel and 5th toe x1 month. She is currently non-ambulatory and uses wheelchair. She lives in [place] with her  but will be staying with her son in [place]. Please refer to H&P on [place] for remaining.

## 2020-05-08 NOTE — PHYSICAL THERAPY INITIAL EVALUATION ADULT - IMPAIRED TRANSFERS: SIT/STAND, REHAB EVAL
decreased ROM/decreased strength/pain/impaired postural control/impaired balance/decreased flexibility

## 2020-05-08 NOTE — DISCHARGE NOTE PROVIDER - NSDCMRMEDTOKEN_GEN_ALL_CORE_FT
aspirin 81 mg oral tablet: 1 tab(s) orally once a day  Benicar HCT 40 mg-12.5 mg oral tablet: 1 tab(s) orally once a day acetaminophen 325 mg oral tablet: 2 tab(s) orally every 6 hours, As needed, Moderate Pain (4 - 6)  aspirin 81 mg oral tablet: 1 tab(s) orally once a day  atorvastatin 40 mg oral tablet: 1 tab(s) orally once a day (at bedtime)  Benicar HCT 40 mg-12.5 mg oral tablet: 1 tab(s) orally once a day  clopidogrel 75 mg oral tablet: 1 tab(s) orally once a day  oxycodone-acetaminophen 5 mg-325 mg oral tablet: 1 tab(s) orally every 6 hours, As needed, Severe Pain (7 - 10)  polyethylene glycol 3350 oral powder for reconstitution: 17 gram(s) orally once a day  senna oral tablet: 2 tab(s) orally once a day (at bedtime)

## 2020-05-08 NOTE — PHYSICAL THERAPY INITIAL EVALUATION ADULT - ADDITIONAL COMMENTS
Patient reports previously independent with all ADLs/IADLs HOWEVER with non-healing (L)heel ulcer, patient primarily ambulatory through transport chair at bedside.

## 2020-05-08 NOTE — PHYSICAL THERAPY INITIAL EVALUATION ADULT - THERAPY FREQUENCY, PT EVAL
Patient educated on frequency of inpatient therapy at [facility], patient verbalized understanding./2-3x/week

## 2020-05-08 NOTE — DISCHARGE NOTE PROVIDER - NSDCFUADDINST_GEN_ALL_CORE_FT
Follow up with  in 1-2 weeks. Call the office at [phone] to schedule your appointment. Instructions for follow-up, activity and diet. Please resume all regular home medications unless specifically advised not to take a particular medication. Also, please take any new medications as prescribed.    Please get plenty of rest, continue to ambulate several times per day, and drink adequate amounts of fluids. Avoid lifting weights greater than 5-10 lbs until you follow-up with your surgeon, who will instruct you further regarding activity restrictions. Avoid driving or operating heavy machinery while taking pain medications. You may shower letting soap and water run over incisions. Pat dry with clean towel when finished.    Dressing Change: Wet to dry dressing change over thigh incision.     Call the office if you experience increasing abdominal pain, nausea, vomiting, or temperature >101 F. Follow up with  in 1-2 weeks. Call the office at [phone] to schedule your appointment for May 22, 2020.     Please get plenty of rest, and drink adequate amounts of fluids. Avoid lifting weights greater than 5-10 lbs until you follow-up with your surgeon, who will instruct you further regarding activity restrictions. Avoid driving or operating heavy machinery while taking pain medications. You may shower letting soap and water run over incisions. Pat dry with clean towel when finished.    Dressing Change: Wet to dry dressing change over thigh incision.     Call the office if you experience increasing abdominal pain, nausea, vomiting, or temperature >101 F.

## 2020-05-08 NOTE — DISCHARGE NOTE PROVIDER - HOSPITAL COURSE
80y female here for elective LLE fem-distal bypass for CLI. PMH HTN, PAD presenting earlier this week with painful ulceration on the left heel and 5th toe x1 month. 5/4: patient underwent diagnostic only, LLE angiogram via right groin access showing distal SFA/AK pop occluded, BK pop with mild atherosclerotic disease proximal L AT/TP trunk/peroneal/PT arteries severely diseased, all occluded shortly after origin. AT reconstitutes in distal lower leg into DP artery L DANIELLE: 0.26, R DANIELLE: 0.80        On 5/7 she underwent a BK pop to DP bypass and distal SFA and pop angioplasty/stent. Post operatively she was started on Aspirin and Plavix, Anderson dc'd and passed TOV, advanced to regular diet and tolerating. 80y female here for elective LLE fem-distal bypass for CLI. PMH HTN, PAD presenting earlier this week with painful ulceration on the left heel and 5th toe x1 month. 5/4: patient underwent diagnostic only, LLE angiogram via right groin access showing distal SFA/AK pop occluded, BK pop with mild atherosclerotic disease proximal L AT/TP trunk/peroneal/PT arteries severely diseased, all occluded shortly after origin. AT reconstitutes in distal lower leg into DP artery L DANIELLE: 0.26, R DANIELLE: 0.80        On 5/7 she underwent a BK pop to DP bypass and distal SFA and pop angioplasty/stent. Post operatively she was started on Aspirin and Plavix, Anderson dc'd and passed TOV, advanced to regular diet and tolerating.  Patient is stable for discharge to [facility].

## 2020-05-08 NOTE — PHYSICAL THERAPY INITIAL EVALUATION ADULT - MANUAL MUSCLE TESTING RESULTS, REHAB EVAL
Grossly assessed with functional movement, bilateral UE and (R)LE greater than or equal to 3+/5; (L)LE limited 2/2 pain

## 2020-05-08 NOTE — PHYSICAL THERAPY INITIAL EVALUATION ADULT - GENERAL OBSERVATIONS, REHAB EVAL
Chart reviewed. IE Completed. Patient without complaints of pain at rest, agreeable to PT. Patient received semi-supine, NAD, +tele, +room air, +IV hep lock, +medial (L)thigh and lower leg dressing with moderate blood (MD Orourke, vascular, aware), RN Hemant cleared patient for treatment session.

## 2020-05-08 NOTE — PHYSICAL THERAPY INITIAL EVALUATION ADULT - IMPAIRMENTS CONTRIBUTING IMPAIRED BED MOBILITY, REHAB EVAL
decreased ROM/pain/impaired postural control/decreased flexibility/decreased strength/impaired balance

## 2020-05-08 NOTE — DISCHARGE NOTE PROVIDER - NSDCCPCAREPLAN_GEN_ALL_CORE_FT
PRINCIPAL DISCHARGE DIAGNOSIS  Diagnosis: Nonhealing skin ulcer  Assessment and Plan of Treatment:       SECONDARY DISCHARGE DIAGNOSES  Diagnosis: PAD (peripheral artery disease)  Assessment and Plan of Treatment:     Diagnosis: Thyroid disease  Assessment and Plan of Treatment:     Diagnosis: HTN (hypertension)  Assessment and Plan of Treatment:

## 2020-05-08 NOTE — DISCHARGE NOTE PROVIDER - CARE PROVIDER_API CALL
Tamiko Fischer)  Surgery; Vascular Surgery  [address] [place] [address]  Phone: [phone]  Fax: [phone]  Follow Up Time: Tamiko Fischer  SURGERY  [address] [address]  Phone: [phone]  Fax: [phone]  Scheduled Appointment: 05/22/2020

## 2020-05-08 NOTE — PROGRESS NOTE ADULT - SUBJECTIVE AND OBJECTIVE BOX
STATUS POST: POD # 1 s/p BK pop to DP bypass and distal SFA and pop angioplasty/stent    INTERVAL HPI/OVERNIGHT EVENTS: Anderson dc'd, voided 100cc     SUBJECTIVE: Examined at the bedside with chief resident. She states she has mild left foot pain, controlled on pain medication. Denies numbness and tingling of the left foot, sob, cp, fevers, chills. No acute complaints.     aspirin enteric coated 81 milliGRAM(s) Oral daily  ceFAZolin   IVPB 2000 milliGRAM(s) IV Intermittent every 8 hours  clopidogrel Tablet 75 milliGRAM(s) Oral daily  heparin   Injectable 5000 Unit(s) SubCutaneous every 8 hours      Vital Signs Last 24 Hrs  T(C): 37.3 (08 May 2020 04:23), Max: 37.3 (08 May 2020 04:23)  T(F): 99.2 (08 May 2020 04:23), Max: 99.2 (08 May 2020 04:23)  HR: 78 (08 May 2020 04:23) (54 - 86)  BP: 133/57 (08 May 2020 04:23) (99/56 - 134/66)  BP(mean): 94 (07 May 2020 16:00) (73 - 94)  RR: 15 (08 May 2020 04:23) (14 - 23)  SpO2: 99% (08 May 2020 04:23) (99% - 100%)  I&O's Detail    07 May 2020 07:01  -  08 May 2020 07:00  --------------------------------------------------------  IN:    Oral Fluid: 100 mL    sodium chloride 0.9%: 270 mL  Total IN: 370 mL    OUT:    Indwelling Catheter - Urethral: 710 mL    Voided: 100 mL  Total OUT: 810 mL    Total NET: -440 mL          Physical Exam  General: NAD, resting comfortably in bed  C/V: NSR  Pulm: Nonlabored breathing, no respiratory distress  Abd: soft, non-tender, non-distended.  Extrem: WWP, no edema, LLE medial thigh and leg dressing clean, minimally saturated, no hematoma. left foot dressing c/d, heel ulcer bloack with clean base, no purulence or erythema  Neuro: A/O x 3, no focal deficits, normal sensation  Pulses: LLE: Palpable DP pulse RLE: biphasic DP/PT pulses    LABS:                        10.5   10.87 )-----------( 220      ( 08 May 2020 06:31 )             33.1     05-08    141  |  107  |  16  ----------------------------<  122<H>  4.2   |  22  |  0.68    Ca    8.7      08 May 2020 06:29  Phos  2.8     05-08  Mg     1.9     05-08    TPro  5.7<L>  /  Alb  3.3  /  TBili  0.5  /  DBili  x   /  AST  17  /  ALT  13  /  AlkPhos  63  05-08    PT/INR - ( 07 May 2020 14:21 )   PT: 12.3 sec;   INR: 1.08          PTT - ( 07 May 2020 14:21 )  PTT:31.2 sec      RADIOLOGY & ADDITIONAL STUDIES:

## 2020-05-08 NOTE — DISCHARGE NOTE PROVIDER - NSDCCPTREATMENT_GEN_ALL_CORE_FT
PRINCIPAL PROCEDURE  Procedure: Vascular surgery  Findings and Treatment: left SFA angioplasty/stent, left BK pop to DP bypass with rGSV

## 2020-05-08 NOTE — PROGRESS NOTE ADULT - SUBJECTIVE AND OBJECTIVE BOX
O/N: carmela SCOTT dc'd  voided 100cc                           80yFemale admitted electively for left lower extremity fem-distal bypass for CLI and non-healing left heel and 5th toe wound, DANIELLE 0.26. s/p  BK pop to DP bypass and distal SFA and pop angioplasty/stent    Pain, nausea control   Reg diet   continue home medications, aspirin (last taken 1 week ago)   AM labs   PT eval in AM

## 2020-05-08 NOTE — PHYSICAL THERAPY INITIAL EVALUATION ADULT - PHYSICAL ASSIST/NONPHYSICAL ASSIST: SIT/STAND, REHAB EVAL
slightly unsteady, no LOB however increased time required to complete task/nonverbal cues (demo/gestures)/verbal cues/2 person assist

## 2020-05-09 LAB
ANION GAP SERPL CALC-SCNC: 8 MMOL/L — SIGNIFICANT CHANGE UP (ref 5–17)
BUN SERPL-MCNC: 14 MG/DL — SIGNIFICANT CHANGE UP (ref 7–23)
CALCIUM SERPL-MCNC: 8.9 MG/DL — SIGNIFICANT CHANGE UP (ref 8.4–10.5)
CHLORIDE SERPL-SCNC: 106 MMOL/L — SIGNIFICANT CHANGE UP (ref 96–108)
CO2 SERPL-SCNC: 23 MMOL/L — SIGNIFICANT CHANGE UP (ref 22–31)
CREAT SERPL-MCNC: 0.69 MG/DL — SIGNIFICANT CHANGE UP (ref 0.5–1.3)
GLUCOSE SERPL-MCNC: 117 MG/DL — HIGH (ref 70–99)
HCT VFR BLD CALC: 28.4 % — LOW (ref 34.5–45)
HCT VFR BLD CALC: 29.4 % — LOW (ref 34.5–45)
HGB BLD-MCNC: 9 G/DL — LOW (ref 11.5–15.5)
HGB BLD-MCNC: 9.3 G/DL — LOW (ref 11.5–15.5)
MAGNESIUM SERPL-MCNC: 2.2 MG/DL — SIGNIFICANT CHANGE UP (ref 1.6–2.6)
MCHC RBC-ENTMCNC: 29.5 PG — SIGNIFICANT CHANGE UP (ref 27–34)
MCHC RBC-ENTMCNC: 29.6 PG — SIGNIFICANT CHANGE UP (ref 27–34)
MCHC RBC-ENTMCNC: 31.6 GM/DL — LOW (ref 32–36)
MCHC RBC-ENTMCNC: 31.7 GM/DL — LOW (ref 32–36)
MCV RBC AUTO: 93.1 FL — SIGNIFICANT CHANGE UP (ref 80–100)
MCV RBC AUTO: 93.6 FL — SIGNIFICANT CHANGE UP (ref 80–100)
NRBC # BLD: 0 /100 WBCS — SIGNIFICANT CHANGE UP (ref 0–0)
NRBC # BLD: 0 /100 WBCS — SIGNIFICANT CHANGE UP (ref 0–0)
PHOSPHATE SERPL-MCNC: 2.2 MG/DL — LOW (ref 2.5–4.5)
PLATELET # BLD AUTO: 193 K/UL — SIGNIFICANT CHANGE UP (ref 150–400)
PLATELET # BLD AUTO: 216 K/UL — SIGNIFICANT CHANGE UP (ref 150–400)
POTASSIUM SERPL-MCNC: 4.1 MMOL/L — SIGNIFICANT CHANGE UP (ref 3.5–5.3)
POTASSIUM SERPL-SCNC: 4.1 MMOL/L — SIGNIFICANT CHANGE UP (ref 3.5–5.3)
RBC # BLD: 3.05 M/UL — LOW (ref 3.8–5.2)
RBC # BLD: 3.14 M/UL — LOW (ref 3.8–5.2)
RBC # FLD: 13.2 % — SIGNIFICANT CHANGE UP (ref 10.3–14.5)
RBC # FLD: 13.4 % — SIGNIFICANT CHANGE UP (ref 10.3–14.5)
SODIUM SERPL-SCNC: 137 MMOL/L — SIGNIFICANT CHANGE UP (ref 135–145)
WBC # BLD: 12 K/UL — HIGH (ref 3.8–10.5)
WBC # BLD: 12.9 K/UL — HIGH (ref 3.8–10.5)
WBC # FLD AUTO: 12 K/UL — HIGH (ref 3.8–10.5)
WBC # FLD AUTO: 12.9 K/UL — HIGH (ref 3.8–10.5)

## 2020-05-09 RX ORDER — POTASSIUM PHOSPHATE, MONOBASIC POTASSIUM PHOSPHATE, DIBASIC 236; 224 MG/ML; MG/ML
15 INJECTION, SOLUTION INTRAVENOUS ONCE
Refills: 0 | Status: COMPLETED | OUTPATIENT
Start: 2020-05-09 | End: 2020-05-09

## 2020-05-09 RX ORDER — ENOXAPARIN SODIUM 100 MG/ML
40 INJECTION SUBCUTANEOUS DAILY
Refills: 0 | Status: DISCONTINUED | OUTPATIENT
Start: 2020-05-09 | End: 2020-05-11

## 2020-05-09 RX ADMIN — HYDROMORPHONE HYDROCHLORIDE 0.5 MILLIGRAM(S): 2 INJECTION INTRAMUSCULAR; INTRAVENOUS; SUBCUTANEOUS at 12:33

## 2020-05-09 RX ADMIN — POTASSIUM PHOSPHATE, MONOBASIC POTASSIUM PHOSPHATE, DIBASIC 63.75 MILLIMOLE(S): 236; 224 INJECTION, SOLUTION INTRAVENOUS at 09:14

## 2020-05-09 RX ADMIN — CLOPIDOGREL BISULFATE 75 MILLIGRAM(S): 75 TABLET, FILM COATED ORAL at 12:33

## 2020-05-09 RX ADMIN — ENOXAPARIN SODIUM 40 MILLIGRAM(S): 100 INJECTION SUBCUTANEOUS at 12:33

## 2020-05-09 RX ADMIN — HYDROMORPHONE HYDROCHLORIDE 0.5 MILLIGRAM(S): 2 INJECTION INTRAMUSCULAR; INTRAVENOUS; SUBCUTANEOUS at 09:33

## 2020-05-09 RX ADMIN — Medication 81 MILLIGRAM(S): at 12:33

## 2020-05-09 RX ADMIN — ATORVASTATIN CALCIUM 40 MILLIGRAM(S): 80 TABLET, FILM COATED ORAL at 21:39

## 2020-05-09 NOTE — PROGRESS NOTE ADULT - SUBJECTIVE AND OBJECTIVE BOX
O/N: SONIA, VSS                                 80yFemale admitted electively for left lower extremity fem-distal bypass for CLI and non-healing left heel and 5th toe wound, DANIELLE 0.26. s/p  BK pop to DP bypass and distal SFA and pop angioplasty/stent    Pain, nausea control   Reg diet   Plavix  Lovenox  continue home medications, aspirin (last taken 1 week ago)   AM labs   PT eval in AM O/N: SONIA, VSS     SUBJECTIVE: c/o mild leg pain, wondering when it will feel normal again. No other specific complaints. Feels better overall. Tolerating diet. Denies fevers/chills.       PMH:  Thyroid disease  HTN (hypertension)  PAD (peripheral artery disease)      Medication:   aspirin enteric coated 81  clopidogrel Tablet 75  enoxaparin Injectable 40        Vital Signs Last 24 Hrs  T(C): 37.4 (09 May 2020 05:56), Max: 37.6 (08 May 2020 20:58)  T(F): 99.4 (09 May 2020 05:56), Max: 99.7 (08 May 2020 20:58)  HR: 93 (09 May 2020 05:56) (90 - 98)  BP: 117/55 (09 May 2020 05:56) (92/57 - 133/62)  BP(mean): 69 (08 May 2020 14:26) (69 - 69)  RR: 16 (09 May 2020 05:56) (16 - 18)  SpO2: 96% (09 May 2020 05:56) (94% - 97%)  I&O's Summary    08 May 2020 07:01  -  09 May 2020 07:00  --------------------------------------------------------  IN: 0 mL / OUT: 725 mL / NET: -725 mL        Physical Exam:  General: NAD, resting comfortably in bed  Pulmonary: Nonlabored breathing, no respiratory distress  Cardiovascular: Regular rate  Abdominal: soft, NT, ND  Extremities: WWP, no edema, LLE medial thigh and leg dressing changed at bedside in sterile fashion, minimal bleeding, mild groin ecchymosis no masses or fluctuance. L heel ulcer black with clean base, L fifth toe ulcer with black eschar.   Pulses: palpable left DP        LABS:                        9.3    12.00 )-----------( 193      ( 09 May 2020 06:05 )             29.4     05-09    137  |  106  |  14  ----------------------------<  117<H>  4.1   |  23  |  0.69    Ca    8.9      09 May 2020 06:05  Phos  2.2     05-09  Mg     2.2     05-09    TPro  5.7<L>  /  Alb  3.3  /  TBili  0.5  /  DBili  x   /  AST  17  /  ALT  13  /  AlkPhos  63  05-08    PT/INR - ( 07 May 2020 14:21 )   PT: 12.3 sec;   INR: 1.08          PTT - ( 07 May 2020 14:21 )  PTT:31.2 sec    Radiology and Additional Studies:                                80yFemale admitted electively for left lower extremity fem-distal bypass for CLI and non-healing left heel and 5th toe wound, DANIELLE 0.26. s/p BK pop to DP bypass and distal SFA and pop angioplasty/stent 5/7    Pain, nausea control   Reg diet   Plavix  Lovenox  continue home medications, aspirin (last taken 1 week ago)   AM labs   PT eval tomorrow

## 2020-05-10 LAB
ANION GAP SERPL CALC-SCNC: 9 MMOL/L — SIGNIFICANT CHANGE UP (ref 5–17)
BUN SERPL-MCNC: 24 MG/DL — HIGH (ref 7–23)
CALCIUM SERPL-MCNC: 8.7 MG/DL — SIGNIFICANT CHANGE UP (ref 8.4–10.5)
CHLORIDE SERPL-SCNC: 106 MMOL/L — SIGNIFICANT CHANGE UP (ref 96–108)
CO2 SERPL-SCNC: 23 MMOL/L — SIGNIFICANT CHANGE UP (ref 22–31)
CREAT SERPL-MCNC: 1.28 MG/DL — SIGNIFICANT CHANGE UP (ref 0.5–1.3)
GLUCOSE SERPL-MCNC: 119 MG/DL — HIGH (ref 70–99)
HCT VFR BLD CALC: 26.4 % — LOW (ref 34.5–45)
HCT VFR BLD CALC: 27.8 % — LOW (ref 34.5–45)
HGB BLD-MCNC: 8.3 G/DL — LOW (ref 11.5–15.5)
HGB BLD-MCNC: 8.7 G/DL — LOW (ref 11.5–15.5)
MAGNESIUM SERPL-MCNC: 2.4 MG/DL — SIGNIFICANT CHANGE UP (ref 1.6–2.6)
MCHC RBC-ENTMCNC: 29.6 PG — SIGNIFICANT CHANGE UP (ref 27–34)
MCHC RBC-ENTMCNC: 30 PG — SIGNIFICANT CHANGE UP (ref 27–34)
MCHC RBC-ENTMCNC: 31.3 GM/DL — LOW (ref 32–36)
MCHC RBC-ENTMCNC: 31.4 GM/DL — LOW (ref 32–36)
MCV RBC AUTO: 94.6 FL — SIGNIFICANT CHANGE UP (ref 80–100)
MCV RBC AUTO: 95.3 FL — SIGNIFICANT CHANGE UP (ref 80–100)
NRBC # BLD: 0 /100 WBCS — SIGNIFICANT CHANGE UP (ref 0–0)
NRBC # BLD: 0 /100 WBCS — SIGNIFICANT CHANGE UP (ref 0–0)
PHOSPHATE SERPL-MCNC: 3.3 MG/DL — SIGNIFICANT CHANGE UP (ref 2.5–4.5)
PLATELET # BLD AUTO: 193 K/UL — SIGNIFICANT CHANGE UP (ref 150–400)
PLATELET # BLD AUTO: 228 K/UL — SIGNIFICANT CHANGE UP (ref 150–400)
POTASSIUM SERPL-MCNC: 4.4 MMOL/L — SIGNIFICANT CHANGE UP (ref 3.5–5.3)
POTASSIUM SERPL-SCNC: 4.4 MMOL/L — SIGNIFICANT CHANGE UP (ref 3.5–5.3)
RBC # BLD: 2.77 M/UL — LOW (ref 3.8–5.2)
RBC # BLD: 2.94 M/UL — LOW (ref 3.8–5.2)
RBC # FLD: 13.3 % — SIGNIFICANT CHANGE UP (ref 10.3–14.5)
RBC # FLD: 13.3 % — SIGNIFICANT CHANGE UP (ref 10.3–14.5)
SODIUM SERPL-SCNC: 138 MMOL/L — SIGNIFICANT CHANGE UP (ref 135–145)
WBC # BLD: 11.98 K/UL — HIGH (ref 3.8–10.5)
WBC # BLD: 12.15 K/UL — HIGH (ref 3.8–10.5)
WBC # FLD AUTO: 11.98 K/UL — HIGH (ref 3.8–10.5)
WBC # FLD AUTO: 12.15 K/UL — HIGH (ref 3.8–10.5)

## 2020-05-10 RX ORDER — POLYETHYLENE GLYCOL 3350 17 G/17G
17 POWDER, FOR SOLUTION ORAL DAILY
Refills: 0 | Status: DISCONTINUED | OUTPATIENT
Start: 2020-05-10 | End: 2020-05-11

## 2020-05-10 RX ORDER — POLYETHYLENE GLYCOL 3350 17 G/17G
17 POWDER, FOR SOLUTION ORAL ONCE
Refills: 0 | Status: COMPLETED | OUTPATIENT
Start: 2020-05-10 | End: 2020-05-10

## 2020-05-10 RX ORDER — ACETAMINOPHEN 500 MG
650 TABLET ORAL EVERY 6 HOURS
Refills: 0 | Status: DISCONTINUED | OUTPATIENT
Start: 2020-05-10 | End: 2020-05-11

## 2020-05-10 RX ORDER — SENNA PLUS 8.6 MG/1
2 TABLET ORAL AT BEDTIME
Refills: 0 | Status: DISCONTINUED | OUTPATIENT
Start: 2020-05-10 | End: 2020-05-11

## 2020-05-10 RX ORDER — OXYCODONE AND ACETAMINOPHEN 5; 325 MG/1; MG/1
1 TABLET ORAL EVERY 6 HOURS
Refills: 0 | Status: DISCONTINUED | OUTPATIENT
Start: 2020-05-10 | End: 2020-05-11

## 2020-05-10 RX ADMIN — ENOXAPARIN SODIUM 40 MILLIGRAM(S): 100 INJECTION SUBCUTANEOUS at 13:09

## 2020-05-10 RX ADMIN — CLOPIDOGREL BISULFATE 75 MILLIGRAM(S): 75 TABLET, FILM COATED ORAL at 13:10

## 2020-05-10 RX ADMIN — Medication 81 MILLIGRAM(S): at 13:10

## 2020-05-10 RX ADMIN — HYDROMORPHONE HYDROCHLORIDE 1 MILLIGRAM(S): 2 INJECTION INTRAMUSCULAR; INTRAVENOUS; SUBCUTANEOUS at 09:49

## 2020-05-10 RX ADMIN — HYDROMORPHONE HYDROCHLORIDE 1 MILLIGRAM(S): 2 INJECTION INTRAMUSCULAR; INTRAVENOUS; SUBCUTANEOUS at 02:04

## 2020-05-10 RX ADMIN — HYDROMORPHONE HYDROCHLORIDE 0.5 MILLIGRAM(S): 2 INJECTION INTRAMUSCULAR; INTRAVENOUS; SUBCUTANEOUS at 05:52

## 2020-05-10 RX ADMIN — POLYETHYLENE GLYCOL 3350 17 GRAM(S): 17 POWDER, FOR SOLUTION ORAL at 13:15

## 2020-05-10 RX ADMIN — ATORVASTATIN CALCIUM 40 MILLIGRAM(S): 80 TABLET, FILM COATED ORAL at 22:10

## 2020-05-10 RX ADMIN — SENNA PLUS 2 TABLET(S): 8.6 TABLET ORAL at 22:10

## 2020-05-10 RX ADMIN — OXYCODONE AND ACETAMINOPHEN 1 TABLET(S): 5; 325 TABLET ORAL at 17:55

## 2020-05-10 NOTE — PROGRESS NOTE ADULT - SUBJECTIVE AND OBJECTIVE BOX
STATUS POST:      INTERVAL HPI/OVERNIGHT EVENTS:      SUBJECTIVE:     aspirin enteric coated 81 milliGRAM(s) Oral daily  clopidogrel Tablet 75 milliGRAM(s) Oral daily  enoxaparin Injectable 40 milliGRAM(s) SubCutaneous daily      Vital Signs Last 24 Hrs  T(C): 36.8 (10 May 2020 05:55), Max: 37.3 (10 May 2020 00:25)  T(F): 98.2 (10 May 2020 05:55), Max: 99.1 (10 May 2020 00:25)  HR: 71 (10 May 2020 05:55) (71 - 97)  BP: 105/53 (10 May 2020 05:55) (101/52 - 131/63)  BP(mean): 87 (09 May 2020 10:57) (87 - 87)  RR: 17 (10 May 2020 05:55) (16 - 19)  SpO2: 91% (10 May 2020 05:55) (91% - 100%)  I&O's Detail    09 May 2020 07:01  -  10 May 2020 07:00  --------------------------------------------------------  IN:  Total IN: 0 mL    OUT:    Voided: 550 mL  Total OUT: 550 mL    Total NET: -550 mL          Physical Exam      LABS:                        8.3    11.98 )-----------( 193      ( 10 May 2020 06:14 )             26.4     05-10    138  |  106  |  24<H>  ----------------------------<  119<H>  4.4   |  23  |  1.28    Ca    8.7      10 May 2020 06:14  Phos  3.3     05-10  Mg     2.4     05-10            RADIOLOGY & ADDITIONAL STUDIES: STATUS POST:  POD # 3 s/p BK pop to DP bypass and distal SFA and pop angioplasty/stent    INTERVAL HPI/OVERNIGHT EVENTS: Hgb 9.3 (10.5), repeat CBC 9.0     SUBJECTIVE: Examined with chief resident at the bedside, resting comfortably. This morning, she feels well; her pain is well-controlled. No nausea or vomiting. Denies BRBPR or bloody BMs, sob, cp, fevers, chills. No acute complaints.     aspirin enteric coated 81 milliGRAM(s) Oral daily  clopidogrel Tablet 75 milliGRAM(s) Oral daily  enoxaparin Injectable 40 milliGRAM(s) SubCutaneous daily      Vital Signs Last 24 Hrs  T(C): 36.8 (10 May 2020 05:55), Max: 37.3 (10 May 2020 00:25)  T(F): 98.2 (10 May 2020 05:55), Max: 99.1 (10 May 2020 00:25)  HR: 71 (10 May 2020 05:55) (71 - 97)  BP: 105/53 (10 May 2020 05:55) (101/52 - 131/63)  BP(mean): 87 (09 May 2020 10:57) (87 - 87)  RR: 17 (10 May 2020 05:55) (16 - 19)  SpO2: 91% (10 May 2020 05:55) (91% - 100%)  I&O's Detail    09 May 2020 07:01  -  10 May 2020 07:00  --------------------------------------------------------  IN:  Total IN: 0 mL    OUT:    Voided: 550 mL  Total OUT: 550 mL    Total NET: -550 mL          Physical Exam  General: NAD, resting comfortably in bed  C/V: NSR  Pulm: Nonlabored breathing, no respiratory distress  Abd: soft, non-tender, non-distended.  Extrem: WWP, no edema, L medial leg incisions c/d/i, no hematoma or palpable masses foot ulcer c/d with no purulence or drainage  Neuro: A/O x 3, no focal deficits, normal sensation  Pulses: LLE: palpable DP.  MICHELLE: no blood noted    LABS:                        8.3    11.98 )-----------( 193      ( 10 May 2020 06:14 )             26.4     05-10    138  |  106  |  24<H>  ----------------------------<  119<H>  4.4   |  23  |  1.28    Ca    8.7      10 May 2020 06:14  Phos  3.3     05-10  Mg     2.4     05-10            RADIOLOGY & ADDITIONAL STUDIES:

## 2020-05-10 NOTE — PROGRESS NOTE ADULT - ASSESSMENT
80yFemale admitted electively for left lower extremity fem-distal bypass for CLI and non-healing left heel and 5th toe wound, DANIELLE 0.26. s/p  BK pop to DP bypass and distal SFA and pop angioplasty/stent    Pain, nausea control   CLD/IVF, advance as tolerated   continue home medications, aspirin (last taken 1 week ago)   Hold HepSubQ  AM labs   PT eval in AM
80yFemale admitted electively for left lower extremity fem-distal bypass for CLI and non-healing left heel and 5th toe wound, DANIELLE 0.26. s/p  BK pop to DP bypass and distal SFA and pop angioplasty/stent. Clinically stable.     Pain, nausea control   Regular Diet   continue home medications, aspirin (last taken 1 week ago)   HepSubQ/Plavix/Aspirin  Heel Boot  AM labs   PT eval in AM
80yFemale admitted electively for left lower extremity fem-distal bypass for CLI and non-healing left heel and 5th toe wound, DANIELLE 0.26. s/p  BK pop to DP bypass and distal SFA and pop angioplasty/stent. Downtrend Hgb 11.8 admission to 8.3 in AM, possibly post surgical acute blood loss however not evident during dressing change; GIB unlikely. Asymptomatic and clinically stable.     Pain, nausea control   Regular Diet   continue home medications, aspirin (last taken 1 week ago)   HepSubQ, Plavix, Aspirin  Heel Boot  f/u 2pm CBC  Bowel regimen   AM labs

## 2020-05-11 ENCOUNTER — TRANSCRIPTION ENCOUNTER (OUTPATIENT)
Age: 80
End: 2020-05-11

## 2020-05-11 VITALS — HEART RATE: 85 BPM | OXYGEN SATURATION: 98 % | SYSTOLIC BLOOD PRESSURE: 106 MMHG | DIASTOLIC BLOOD PRESSURE: 65 MMHG

## 2020-05-11 LAB
ANION GAP SERPL CALC-SCNC: 11 MMOL/L — SIGNIFICANT CHANGE UP (ref 5–17)
BUN SERPL-MCNC: 29 MG/DL — HIGH (ref 7–23)
CALCIUM SERPL-MCNC: 8.8 MG/DL — SIGNIFICANT CHANGE UP (ref 8.4–10.5)
CHLORIDE SERPL-SCNC: 107 MMOL/L — SIGNIFICANT CHANGE UP (ref 96–108)
CO2 SERPL-SCNC: 22 MMOL/L — SIGNIFICANT CHANGE UP (ref 22–31)
CREAT SERPL-MCNC: 1.06 MG/DL — SIGNIFICANT CHANGE UP (ref 0.5–1.3)
GLUCOSE SERPL-MCNC: 105 MG/DL — HIGH (ref 70–99)
HCT VFR BLD CALC: 26.9 % — LOW (ref 34.5–45)
HGB BLD-MCNC: 8.3 G/DL — LOW (ref 11.5–15.5)
MAGNESIUM SERPL-MCNC: 2.4 MG/DL — SIGNIFICANT CHANGE UP (ref 1.6–2.6)
MCHC RBC-ENTMCNC: 29.7 PG — SIGNIFICANT CHANGE UP (ref 27–34)
MCHC RBC-ENTMCNC: 30.9 GM/DL — LOW (ref 32–36)
MCV RBC AUTO: 96.4 FL — SIGNIFICANT CHANGE UP (ref 80–100)
NRBC # BLD: 0 /100 WBCS — SIGNIFICANT CHANGE UP (ref 0–0)
PHOSPHATE SERPL-MCNC: 3 MG/DL — SIGNIFICANT CHANGE UP (ref 2.5–4.5)
PLATELET # BLD AUTO: 229 K/UL — SIGNIFICANT CHANGE UP (ref 150–400)
POTASSIUM SERPL-MCNC: 4.4 MMOL/L — SIGNIFICANT CHANGE UP (ref 3.5–5.3)
POTASSIUM SERPL-SCNC: 4.4 MMOL/L — SIGNIFICANT CHANGE UP (ref 3.5–5.3)
RBC # BLD: 2.79 M/UL — LOW (ref 3.8–5.2)
RBC # FLD: 13.1 % — SIGNIFICANT CHANGE UP (ref 10.3–14.5)
SODIUM SERPL-SCNC: 140 MMOL/L — SIGNIFICANT CHANGE UP (ref 135–145)
WBC # BLD: 9.27 K/UL — SIGNIFICANT CHANGE UP (ref 3.8–10.5)
WBC # FLD AUTO: 9.27 K/UL — SIGNIFICANT CHANGE UP (ref 3.8–10.5)

## 2020-05-11 PROCEDURE — 85730 THROMBOPLASTIN TIME PARTIAL: CPT

## 2020-05-11 PROCEDURE — 85025 COMPLETE CBC W/AUTO DIFF WBC: CPT

## 2020-05-11 PROCEDURE — 86850 RBC ANTIBODY SCREEN: CPT

## 2020-05-11 PROCEDURE — 80053 COMPREHEN METABOLIC PANEL: CPT

## 2020-05-11 PROCEDURE — 36415 COLL VENOUS BLD VENIPUNCTURE: CPT

## 2020-05-11 PROCEDURE — 83735 ASSAY OF MAGNESIUM: CPT

## 2020-05-11 PROCEDURE — 85610 PROTHROMBIN TIME: CPT

## 2020-05-11 PROCEDURE — C1894: CPT

## 2020-05-11 PROCEDURE — 86901 BLOOD TYPING SEROLOGIC RH(D): CPT

## 2020-05-11 PROCEDURE — 85027 COMPLETE CBC AUTOMATED: CPT

## 2020-05-11 PROCEDURE — C1725: CPT

## 2020-05-11 PROCEDURE — 87635 SARS-COV-2 COVID-19 AMP PRB: CPT

## 2020-05-11 PROCEDURE — 84100 ASSAY OF PHOSPHORUS: CPT

## 2020-05-11 PROCEDURE — C1887: CPT

## 2020-05-11 PROCEDURE — 97530 THERAPEUTIC ACTIVITIES: CPT

## 2020-05-11 PROCEDURE — C1889: CPT

## 2020-05-11 PROCEDURE — C1874: CPT

## 2020-05-11 PROCEDURE — 80048 BASIC METABOLIC PNL TOTAL CA: CPT

## 2020-05-11 PROCEDURE — 97162 PT EVAL MOD COMPLEX 30 MIN: CPT

## 2020-05-11 PROCEDURE — C1769: CPT

## 2020-05-11 PROCEDURE — 76000 FLUOROSCOPY <1 HR PHYS/QHP: CPT

## 2020-05-11 RX ORDER — SENNA PLUS 8.6 MG/1
2 TABLET ORAL
Qty: 0 | Refills: 0 | DISCHARGE
Start: 2020-05-11

## 2020-05-11 RX ORDER — CLOPIDOGREL BISULFATE 75 MG/1
1 TABLET, FILM COATED ORAL
Qty: 0 | Refills: 0 | DISCHARGE
Start: 2020-05-11

## 2020-05-11 RX ORDER — ACETAMINOPHEN 500 MG
2 TABLET ORAL
Qty: 0 | Refills: 0 | DISCHARGE
Start: 2020-05-11

## 2020-05-11 RX ORDER — ATORVASTATIN CALCIUM 80 MG/1
1 TABLET, FILM COATED ORAL
Qty: 0 | Refills: 0 | DISCHARGE
Start: 2020-05-11

## 2020-05-11 RX ORDER — POLYETHYLENE GLYCOL 3350 17 G/17G
17 POWDER, FOR SOLUTION ORAL
Qty: 0 | Refills: 0 | DISCHARGE
Start: 2020-05-11

## 2020-05-11 RX ADMIN — CLOPIDOGREL BISULFATE 75 MILLIGRAM(S): 75 TABLET, FILM COATED ORAL at 12:22

## 2020-05-11 RX ADMIN — Medication 81 MILLIGRAM(S): at 12:22

## 2020-05-11 RX ADMIN — POLYETHYLENE GLYCOL 3350 17 GRAM(S): 17 POWDER, FOR SOLUTION ORAL at 12:22

## 2020-05-11 RX ADMIN — OXYCODONE AND ACETAMINOPHEN 1 TABLET(S): 5; 325 TABLET ORAL at 08:04

## 2020-05-11 RX ADMIN — ENOXAPARIN SODIUM 40 MILLIGRAM(S): 100 INJECTION SUBCUTANEOUS at 12:22

## 2020-05-11 NOTE — PROGRESS NOTE ADULT - SUBJECTIVE AND OBJECTIVE BOX
24hr Events:  O/N; SONIA, VSS  5/10: Am Hgb 8.3 from pre op 11.0, repeat in PM 8.7, Miralax and senna ordered,         Assessment/Plan:  80yFemale admitted electively for left lower extremity fem-distal bypass for CLI and non-healing left heel and 5th toe wound, DANIELLE 0.26. s/p  BK pop to DP bypass and distal SFA and pop angioplasty/stent. Downtrend Hgb 11.8 admission to 8.3 in AM, possibly post surgical acute blood loss however not evident during dressing change; GIB unlikely. Asymptomatic and clinically stable.     Pain, nausea control   Regular Diet   continue home medications, aspirin (last taken 1 week ago)   HepSubQ, Plavix, Aspirin  Heel Boot  Bowel regimen   AM labs STATUS POST:  POD # 3 s/p BK pop to DP bypass and distal SFA and pop angioplasty/stent    24hr Events:  O/N; SONIA, VSS  5/10: Am Hgb 8.3 from pre op 11.0, repeat in PM 8.7, Miralax and senna ordered,       Patient is a 80y old  Female who presents with a chief complaint of Elective admission for arterial bypass, CLI (11 May 2020 05:33)      INTERVAL HPI/OVERNIGHT EVENTS:      MEDICATIONS  (STANDING):  aspirin enteric coated 81 milliGRAM(s) Oral daily  atorvastatin 40 milliGRAM(s) Oral at bedtime  clopidogrel Tablet 75 milliGRAM(s) Oral daily  enoxaparin Injectable 40 milliGRAM(s) SubCutaneous daily  polyethylene glycol 3350 17 Gram(s) Oral daily  senna 2 Tablet(s) Oral at bedtime    MEDICATIONS  (PRN):  acetaminophen   Tablet .. 650 milliGRAM(s) Oral every 6 hours PRN Moderate Pain (4 - 6)  HYDROmorphone  Injectable 0.5 milliGRAM(s) IV Push every 3 hours PRN Breakthrough pain  oxycodone    5 mG/acetaminophen 325 mG 1 Tablet(s) Oral every 6 hours PRN Severe Pain (7 - 10)      Allergies    No Known Allergies    Intolerances          Vital Signs Last 24 Hrs  T(C): 36.1 (11 May 2020 09:07), Max: 37.7 (10 May 2020 20:32)  T(F): 96.9 (11 May 2020 09:07), Max: 99.8 (10 May 2020 20:32)  HR: 72 (11 May 2020 09:07) (72 - 84)  BP: 103/50 (11 May 2020 09:07) (103/50 - 132/63)  BP(mean): --  RR: 16 (11 May 2020 09:07) (16 - 19)  SpO2: 97% (11 May 2020 09:07) (95% - 97%)  CAPILLARY BLOOD GLUCOSE          05-10 @ 07:01  -  05-11 @ 07:00  --------------------------------------------------------  IN: 780 mL / OUT: 725 mL / NET: 55 mL        Physical Exam:    Daily     Daily   General:  Well appearing, NAD  Pulm: Nonlabored breathing, no respiratory distress  Abd: soft, non-tender, non-distended.  Extrem: WWP, no edema, L medial leg incisions c/d/i, no hematoma or palpable masses foot ulcer c/d with no purulence or drainage  Neuro: A/O x 3, no focal deficits, normal sensation  Pulses: LLE: palpable DP.  MICHELLE: no blood noted    LABS:                        8.3    9.27  )-----------( 229      ( 11 May 2020 06:36 )             26.9     05-11    140  |  107  |  29<H>  ----------------------------<  105<H>  4.4   |  22  |  1.06    Ca    8.8      11 May 2020 06:36  Phos  3.0     05-11  Mg     2.4     05-11              RADIOLOGY & ADDITIONAL TESTS:      ---------------------------------------------------------------------------  PLEASE CHECK WHEN PRESENT:     [  ] Heart Failure     [  ] Acute     [  ] Acute on Chronic     [  ] Chronic  -------------------------------------------------------------------     [  ]Diastolic [HFpEF]     [  ]Systolic [HFrEF]     [  ]Combined [HFpEF & HFrEF]     [ X ]Other: hypertensive heart disease  -------------------------------------------------------------------  [ ] Respiratory failure  [ ] Acute cor pulmonale  [ ] Asthma/COPD Exacerbation  [ ] Pleural effusion  [ ] Aspiration pneumonia  -------------------------------------------------------------------  [  ]MANOJ     [  ]ATN     [  ]Reneal Medullary Necrosis     [  ]Renal Cortical Necrosis     [  ]Other Pathological Lesions:    [  ]CKD 1  [  ]CKD 2  [  ]CKD 3  [  ]CKD 4  [  ]CKD 5  [  ]Other  -------------------------------------------------------------------  [  ]Diabetes  [  ] Diabetic PVD Ulcer  [  ] Neuropathic ulcer to DM  [  ] Diabetes with Nephropathy  [  ] Osteomyelitis due to diabetes  --------------------------------------------------------------------  [  ]Malnutrition: See Nutrition Note  [  ]Cachexia  [  ]Other:   [  ]Supplement Ordered:  [  ]Morbid Obesity (BMI >=40]  ---------------------------------------------------------------------  [ ] Sepsis/severe sepsis/septic shock  [ ] UTI  [ ] Pneumonia  -----------------------------------------------------------------------  [ ] Acidosis/alkalosis  [ ] Fluid overload  [ ] Hypokalemia  [ ] Hyperkalemia  [ ] Hypomagnesemia  [ ] Hypophosphatemia  [ ] Hyperphosphatemia  ------------------------------------------------------------------------  [ ] Acute blood loss anemia  [X ] Post op blood loss anemia  [ ] Iron deficiency anemia  [ ] Anemia due to chronic disease  [ ] Hypercoagulable state  ----------------------------------------------------------------------  [ ] Cerebral infarction  [ ] Transient ischemia attack  [ ] Encephalopathy      Assessment/Plan:  80yFemale admitted electively for left lower extremity fem-distal bypass for CLI and non-healing left heel and 5th toe wound, DANIELLE 0.26. s/p  BK pop to DP bypass and distal SFA and pop angioplasty/stent. Downtrend Hgb 11.8 admission to 8.3 in AM, possibly post surgical acute blood loss however not evident during dressing change; GIB unlikely. Asymptomatic and clinically stable.     Pain, nausea control   Regular Diet   continue home medications, aspirin (last taken 1 week ago)   HepSubQ, Plavix, Aspirin  Heel Boot  Bowel regimen   AM labs STATUS POST:  POD # 4 s/p BK pop to DP bypass and distal SFA and pop angioplasty/stent    24hr Events:  O/N; SONIA, VSS  5/10: Am Hgb 8.3 from pre op 11.0, repeat in PM 8.7, Miralax and senna ordered,       Patient is a 80y old  Female who presents with a chief complaint of Elective admission for arterial bypass, CLI (11 May 2020 05:33)      INTERVAL HPI/OVERNIGHT EVENTS:      MEDICATIONS  (STANDING):  aspirin enteric coated 81 milliGRAM(s) Oral daily  atorvastatin 40 milliGRAM(s) Oral at bedtime  clopidogrel Tablet 75 milliGRAM(s) Oral daily  enoxaparin Injectable 40 milliGRAM(s) SubCutaneous daily  polyethylene glycol 3350 17 Gram(s) Oral daily  senna 2 Tablet(s) Oral at bedtime    MEDICATIONS  (PRN):  acetaminophen   Tablet .. 650 milliGRAM(s) Oral every 6 hours PRN Moderate Pain (4 - 6)  HYDROmorphone  Injectable 0.5 milliGRAM(s) IV Push every 3 hours PRN Breakthrough pain  oxycodone    5 mG/acetaminophen 325 mG 1 Tablet(s) Oral every 6 hours PRN Severe Pain (7 - 10)      Allergies    No Known Allergies    Intolerances          Vital Signs Last 24 Hrs  T(C): 36.1 (11 May 2020 09:07), Max: 37.7 (10 May 2020 20:32)  T(F): 96.9 (11 May 2020 09:07), Max: 99.8 (10 May 2020 20:32)  HR: 72 (11 May 2020 09:07) (72 - 84)  BP: 103/50 (11 May 2020 09:07) (103/50 - 132/63)  BP(mean): --  RR: 16 (11 May 2020 09:07) (16 - 19)  SpO2: 97% (11 May 2020 09:07) (95% - 97%)  CAPILLARY BLOOD GLUCOSE          05-10 @ 07:01  -  05-11 @ 07:00  --------------------------------------------------------  IN: 780 mL / OUT: 725 mL / NET: 55 mL        Physical Exam:      General:  Well appearing, NAD  Pulm: Nonlabored breathing, no respiratory distress  Abd: soft, non-tender, non-distended.  Extrem: WWP, no edema, L medial leg incisions c/d/i, no hematoma or palpable masses foot ulcer c/d with no purulence or drainage  Neuro: A/O x 3, no focal deficits, normal sensation  Pulses: LLE: palpable DP.  MICHELLE: no blood noted    LABS:                        8.3    9.27  )-----------( 229      ( 11 May 2020 06:36 )             26.9     05-11    140  |  107  |  29<H>  ----------------------------<  105<H>  4.4   |  22  |  1.06    Ca    8.8      11 May 2020 06:36  Phos  3.0     05-11  Mg     2.4     05-11              RADIOLOGY & ADDITIONAL TESTS:      ---------------------------------------------------------------------------  PLEASE CHECK WHEN PRESENT:     [  ] Heart Failure     [  ] Acute     [  ] Acute on Chronic     [  ] Chronic  -------------------------------------------------------------------     [  ]Diastolic [HFpEF]     [  ]Systolic [HFrEF]     [  ]Combined [HFpEF & HFrEF]     [ X ]Other: hypertensive heart disease  -------------------------------------------------------------------  [ ] Respiratory failure  [ ] Acute cor pulmonale  [ ] Asthma/COPD Exacerbation  [ ] Pleural effusion  [ ] Aspiration pneumonia  -------------------------------------------------------------------  [  ]MANOJ     [  ]ATN     [  ]Reneal Medullary Necrosis     [  ]Renal Cortical Necrosis     [  ]Other Pathological Lesions:    [  ]CKD 1  [  ]CKD 2  [  ]CKD 3  [  ]CKD 4  [  ]CKD 5  [  ]Other  -------------------------------------------------------------------  [  ]Diabetes  [  ] Diabetic PVD Ulcer  [  ] Neuropathic ulcer to DM  [  ] Diabetes with Nephropathy  [  ] Osteomyelitis due to diabetes  --------------------------------------------------------------------  [  ]Malnutrition: See Nutrition Note  [  ]Cachexia  [  ]Other:   [  ]Supplement Ordered:  [  ]Morbid Obesity (BMI >=40]  ---------------------------------------------------------------------  [ ] Sepsis/severe sepsis/septic shock  [ ] UTI  [ ] Pneumonia  -----------------------------------------------------------------------  [ ] Acidosis/alkalosis  [ ] Fluid overload  [ ] Hypokalemia  [ ] Hyperkalemia  [ ] Hypomagnesemia  [ ] Hypophosphatemia  [ ] Hyperphosphatemia  ------------------------------------------------------------------------  [ ] Acute blood loss anemia  [X ] Post op blood loss anemia  [ ] Iron deficiency anemia  [ ] Anemia due to chronic disease  [ ] Hypercoagulable state  ----------------------------------------------------------------------  [ ] Cerebral infarction  [ ] Transient ischemia attack  [ ] Encephalopathy      Assessment/Plan:  80yFemale admitted electively for left lower extremity fem-distal bypass for CLI and non-healing left heel and 5th toe wound, DANIELLE 0.26. s/p  BK pop to DP bypass and distal SFA and pop angioplasty/stent. Downtrend Hgb 11.8 admission to 8.3 in AM, possibly post surgical acute blood loss however not evident during dressing change; GIB unlikely. Asymptomatic and clinically stable.     Pain, nausea control   Regular Diet   continue home medications, aspirin (last taken 1 week ago)   lovenox, Plavix, Aspirin  Heel Boot  Bowel regimen   AM labs

## 2020-05-11 NOTE — DISCHARGE NOTE NURSING/CASE MANAGEMENT/SOCIAL WORK - PATIENT PORTAL LINK FT
You can access the FollowMyHealth Patient Portal offered by [facility] by registering at the following website: [email or web address]. By joining [facility]’s FollowMyHealth portal, you will also be able to view your health information using other applications (apps) compatible with our system.

## 2020-05-11 NOTE — PROGRESS NOTE ADULT - REASON FOR ADMISSION
Elective admission for arterial bypass, CLI

## 2020-05-13 DIAGNOSIS — L97.509 NON-PRESSURE CHRONIC ULCER OF OTHER PART OF UNSPECIFIED FOOT WITH UNSPECIFIED SEVERITY: ICD-10-CM

## 2020-05-15 DIAGNOSIS — I70.245 ATHEROSCLEROSIS OF NATIVE ARTERIES OF LEFT LEG WITH ULCERATION OF OTHER PART OF FOOT: ICD-10-CM

## 2020-05-15 DIAGNOSIS — L97.829 NON-PRESSURE CHRONIC ULCER OF OTHER PART OF LEFT LOWER LEG WITH UNSPECIFIED SEVERITY: ICD-10-CM

## 2020-05-15 DIAGNOSIS — I73.9 PERIPHERAL VASCULAR DISEASE, UNSPECIFIED: ICD-10-CM

## 2020-05-15 DIAGNOSIS — D62 ACUTE POSTHEMORRHAGIC ANEMIA: ICD-10-CM

## 2020-05-15 DIAGNOSIS — Z79.82 LONG TERM (CURRENT) USE OF ASPIRIN: ICD-10-CM

## 2020-05-15 DIAGNOSIS — I10 ESSENTIAL (PRIMARY) HYPERTENSION: ICD-10-CM

## 2020-05-15 DIAGNOSIS — E07.9 DISORDER OF THYROID, UNSPECIFIED: ICD-10-CM

## 2020-05-15 DIAGNOSIS — Z03.818 ENCOUNTER FOR OBSERVATION FOR SUSPECTED EXPOSURE TO OTHER BIOLOGICAL AGENTS RULED OUT: ICD-10-CM

## 2020-05-15 DIAGNOSIS — I70.244 ATHEROSCLEROSIS OF NATIVE ARTERIES OF LEFT LEG WITH ULCERATION OF HEEL AND MIDFOOT: ICD-10-CM

## 2020-05-15 DIAGNOSIS — I70.248 ATHEROSCLEROSIS OF NATIVE ARTERIES OF LEFT LEG WITH ULCERATION OF OTHER PART OF LOWER LEG: ICD-10-CM

## 2020-05-22 ENCOUNTER — APPOINTMENT (OUTPATIENT)
Dept: VASCULAR SURGERY | Facility: CLINIC | Age: 80
End: 2020-05-22
Payer: MEDICARE

## 2020-05-22 DIAGNOSIS — L98.499 NON-PRESSURE CHRONIC ULCER OF SKIN OF OTHER SITES WITH UNSPECIFIED SEVERITY: ICD-10-CM

## 2020-05-22 PROCEDURE — 99024 POSTOP FOLLOW-UP VISIT: CPT

## 2020-05-22 NOTE — DISCUSSION/SUMMARY
[FreeTextEntry1] : 80yoF s/p L SFA PTA/stent, L pop-DP bypass w/reverse saphenous vein for treatment of a non-healing L heel ulcer.  Currently, pt in rehab facility but has not started any PT to date; she reports swelling in the LLE.  As per rehab nurses, wound is dry and slowly healing, no evidence of infection.\par \par L heel wound dry, 2.5x2.5cm and healing well, no evidence of infection.  Duplex reveals widely patent L SFA and pop-DP bypass.  Sutures removed from medial thigh and staples removed from L groin, all other sutures/staples to be removed in 2wks at f/u visit.  Pt encouraged to weight bear and ambulate, can be d/c'd from [facility] once cleared by PT.

## 2020-05-22 NOTE — REASON FOR VISIT
[de-identified] : L SFA PTA/stent, L pop-DP bypass w/reverse saphenous vein [de-identified] : 05/07/2020 [de-identified] : 2 [de-identified] : 15 [de-identified] : Pt returning after L SFA PTA/stent, L pop-DP bypass w/reverse saphenous vein for treatment of a non-healing L heel ulcer.  Currently, pt in rehab facility but has not started any PT to date; she reports swelling in the LLE.  As per rehab nurses, wound is dry and slowly healing, no evidence of infection. [Spouse] : spouse [Family Member] : family member

## 2020-05-22 NOTE — DISCUSSION/SUMMARY
[FreeTextEntry1] : 80yoF s/p L SFA PTA/stent, L pop-DP bypass w/reverse saphenous vein for treatment of a non-healing L heel ulcer.  Currently, pt in rehab facility but has not started any PT to date; she reports swelling in the LLE.  As per rehab nurses, wound is dry and slowly healing, no evidence of infection.\par \par L heel wound dry, 2.5x2.5cm and healing well, no evidence of infection.  Duplex reveals widely patent L SFA and pop-DP bypass.  Sutures removed from medial thigh and staples removed from L groin, all other sutures/staples to be removed in 2wks at f/u visit.  Pt encouraged to weight bear and ambulate, can be d/c'd from [facility] once cleared by PT.

## 2020-05-22 NOTE — REASON FOR VISIT
[facility]                          CERTIFICATE OF SCHOOL ABSENCE          Re: Cachorro Gunn      1/6/2017      This is to certify that Cachorro Gunn has been under my care on 1/6/2016 and can return to school on 1/9/2016.    If you have any questions, please call our office at [phone].    Thank you,        Eloisa RamosFormerly [facility]  [address]  [place] [address]  [email or web address]  Phone: [phone]   [de-identified] : 05/07/2020 [de-identified] : L SFA PTA/stent, L pop-DP bypass w/reverse saphenous vein [de-identified] : Pt returning after L SFA PTA/stent, L pop-DP bypass w/reverse saphenous vein for treatment of a non-healing L heel ulcer.  Currently, pt in rehab facility but has not started any PT to date; she reports swelling in the LLE.  As per rehab nurses, wound is dry and slowly healing, no evidence of infection. [de-identified] : 15 [de-identified] : 2 [Spouse] : spouse [Family Member] : family member

## 2020-05-22 NOTE — PHYSICAL EXAM
[FreeTextEntry1] : bypass patent, good pulse on the graft\par ulcers are healing\par  [JVD] : no jugular venous distention  [Normal Thyroid] : the thyroid was normal [Normal Breath Sounds] : Normal breath sounds [Normal Heart Sounds] : normal heart sounds [Normal Rate and Rhythm] : normal rate and rhythm [0] : left 0 [1+] : right 1+ [2+] : left 2+ [Ankle Swelling (On Exam)] : present [Ankle Swelling On The Left] : of the left ankle [] : not present [Varicose Veins Of Lower Extremities] : not present [Abdomen Masses] : No abdominal masses [Abdomen Tenderness] : ~T ~M No abdominal tenderness [No HSM] : no hepatosplenomegaly [Purpura] : no purpura  [Petechiae] : no petechiae [Skin Ulcer] : ulcer [Skin Induration] : no induration [Alert] : alert [Calm] : calm [de-identified] : pleasant, presents in wheelchair, NAD [de-identified] : FROM throughout, able to fully weight bear w/o assistance [de-identified] : NC/AT, anicteric [de-identified] : L medial heel eschar dry, measures 2.5x2.5cm, no periwound erythema, healed ulcer on plantar 5th toe

## 2020-05-22 NOTE — PHYSICAL EXAM
[FreeTextEntry1] : bypass patent, good pulse on the graft\par ulcers are healing\par  [Normal Thyroid] : the thyroid was normal [JVD] : no jugular venous distention  [Normal Heart Sounds] : normal heart sounds [Normal Breath Sounds] : Normal breath sounds [Normal Rate and Rhythm] : normal rate and rhythm [0] : left 0 [1+] : right 1+ [2+] : left 2+ [Ankle Swelling (On Exam)] : present [Ankle Swelling On The Left] : moderate [Varicose Veins Of Lower Extremities] : not present [] : not present [Abdomen Masses] : No abdominal masses [Abdomen Tenderness] : ~T ~M No abdominal tenderness [No HSM] : no hepatosplenomegaly [Petechiae] : no petechiae [Purpura] : no purpura  [Skin Ulcer] : ulcer [Skin Induration] : no induration [Alert] : alert [Calm] : calm [de-identified] : pleasant, presents in wheelchair, NAD [de-identified] : FROM throughout, able to fully weight bear w/o assistance [de-identified] : NC/AT, anicteric [de-identified] : L medial heel eschar dry, measures 2.5x2.5cm, no periwound erythema, healed ulcer on plantar 5th toe

## 2020-05-27 DIAGNOSIS — I10 ESSENTIAL (PRIMARY) HYPERTENSION: ICD-10-CM

## 2020-05-27 RX ORDER — OLMESARTAN MEDOXOMIL AND HYDROCHLOROTHIAZIDE 40; 12.5 MG/1; MG/1
40-12.5 TABLET ORAL DAILY
Qty: 30 | Refills: 0 | Status: ACTIVE | COMMUNITY
Start: 2020-05-27 | End: 1900-01-01

## 2020-05-27 RX ORDER — ASPIRIN ENTERIC COATED TABLETS 81 MG 81 MG/1
81 TABLET, DELAYED RELEASE ORAL DAILY
Qty: 30 | Refills: 0 | Status: ACTIVE | COMMUNITY
Start: 2020-05-27 | End: 1900-01-01

## 2020-05-27 RX ORDER — ATORVASTATIN CALCIUM 40 MG/1
40 TABLET, FILM COATED ORAL DAILY
Qty: 30 | Refills: 0 | Status: ACTIVE | COMMUNITY
Start: 2020-05-27 | End: 1900-01-01

## 2020-06-08 ENCOUNTER — APPOINTMENT (OUTPATIENT)
Dept: VASCULAR SURGERY | Facility: CLINIC | Age: 80
End: 2020-06-08
Payer: MEDICARE

## 2020-06-08 PROCEDURE — 99024 POSTOP FOLLOW-UP VISIT: CPT

## 2020-06-08 NOTE — PHYSICAL EXAM
[2+] : left 2+ [1+] : left 1+ [Ankle Swelling (On Exam)] : present [Ankle Swelling On The Left] : moderate [Alert] : alert [Calm] : calm [JVD] : no jugular venous distention  [Varicose Veins Of Lower Extremities] : not present [Abdomen Tenderness] : ~T ~M No abdominal tenderness [] : not present [de-identified] : WN/WD, NAD, on a wheelchair [de-identified] : NC/AT [de-identified] : LLE: + well healing incision over medial thigh, calf and dorsum of foot. +sutures and staples in place, no signs of infection. L medial heel with 1 x 1 cm tender, necrotic ulcer and mild surrounding erythema

## 2020-06-08 NOTE — REASON FOR VISIT
[Spouse] : spouse [de-identified] : LLE Pop-DP bypass, SFA stent [de-identified] : 5/7/2020 [de-identified] : 79 yo F with LLE ischemic rest pain and non-healing ulcer requiring the above procedure returns for a post op visit. Pt continues to experience mild pain along the incision and leg edema. Her medial heel ulcer is painful to touch. She ambulates around her house. No fever, chills, drainage from the incisions.

## 2020-06-08 NOTE — DISCUSSION/SUMMARY
[FreeTextEntry1] : 79 yo F with PAD, ischemic rest pain, non-healing ulcers requiring Pop-DP bypass/SFA stent on 5/7/202o, returns for a post-op visit.\par Patient overall is doing well, with mild incisional leg pain and edema.\par Incisions are well healed, sutures and staples removed.\par L medial heel ulcer is tender, no drainage, no signs of infection.\par Patient was recommended to wear a "bunny boot", especially at night to avoid pressure on the ulcer.\par Paint the ulcer with Betadine.\par F/u in 2 weeks.

## 2020-06-22 ENCOUNTER — APPOINTMENT (OUTPATIENT)
Dept: VASCULAR SURGERY | Facility: CLINIC | Age: 80
End: 2020-06-22
Payer: MEDICARE

## 2020-06-22 PROCEDURE — 99024 POSTOP FOLLOW-UP VISIT: CPT

## 2020-07-01 NOTE — DISCUSSION/SUMMARY
[FreeTextEntry1] : 79 yo F with LLE ischemic rest pain and non-healing ulcer requiring the above procedure returns for a post op visit.\par -small opening, clean daily with soap and water and apply bacitracin to opening. \par -Keep left groin incision as dry as possible, apply gauze between skin folds. \par -FU in one month or sooner should symptoms worsen.

## 2020-07-01 NOTE — REASON FOR VISIT
[Spouse] : spouse [de-identified] : LLE Pop-DP bypass, SFA stent [de-identified] : 81 yo F with LLE ischemic rest pain and non-healing ulcer requiring the above procedure returns for a post op visit. Pt continues to experience mild pain along the incision and leg edema. Her medial heel ulcer is painful to touch. She ambulates around her house. No fever, chills, drainage from the incisions. Small opening in the medial thigh and in the left groin.  [de-identified] : 5/7/2020

## 2020-07-01 NOTE — REVIEW OF SYSTEMS
[Lower Ext Edema] : lower extremity edema [As Noted in HPI] : as noted in HPI [Skin Wound] : skin wound [Negative] : Endocrine [Fever] : no fever [Leg Claudication] : no intermittent leg claudication [Chills] : no chills

## 2020-07-01 NOTE — PHYSICAL EXAM
[2+] : left 2+ [1+] : left 1+ [Ankle Swelling (On Exam)] : present [Ankle Swelling On The Left] : moderate [Calm] : calm [Alert] : alert [JVD] : no jugular venous distention  [] : not present [Varicose Veins Of Lower Extremities] : not present [de-identified] : NC/AT [Abdomen Tenderness] : ~T ~M No abdominal tenderness [de-identified] : WN/WD, NAD, on a wheelchair [de-identified] : LLE: + well healing incision over medial thigh, calf and dorsum of foot. small oening in the medial thigh about 1x1cm, probes about 1cm deep, no erythema or drainage. Left groin incision slightly open/macerated. L medial heel with 1 x 1 cm tender, necrotic ulcer and mild surrounding erythema

## 2020-07-09 ENCOUNTER — APPOINTMENT (OUTPATIENT)
Dept: VASCULAR SURGERY | Facility: CLINIC | Age: 80
End: 2020-07-09
Payer: MEDICARE

## 2020-07-09 ENCOUNTER — INPATIENT (INPATIENT)
Facility: HOSPITAL | Age: 80
LOS: 4 days | Discharge: HOME HEALTH | DRG: 857 | End: 2020-07-14
Attending: SURGERY | Admitting: SURGERY
Payer: MEDICARE

## 2020-07-09 ENCOUNTER — TRANSCRIPTION ENCOUNTER (OUTPATIENT)
Age: 80
End: 2020-07-09

## 2020-07-09 VITALS
HEART RATE: 87 BPM | DIASTOLIC BLOOD PRESSURE: 70 MMHG | OXYGEN SATURATION: 98 % | RESPIRATION RATE: 18 BRPM | TEMPERATURE: 98 F | WEIGHT: 167.55 LBS | SYSTOLIC BLOOD PRESSURE: 111 MMHG | HEIGHT: 64.96 IN

## 2020-07-09 DIAGNOSIS — S82.899A OTHER FRACTURE OF UNSPECIFIED LOWER LEG, INITIAL ENCOUNTER FOR CLOSED FRACTURE: Chronic | ICD-10-CM

## 2020-07-09 LAB
ALBUMIN SERPL ELPH-MCNC: 4 G/DL — SIGNIFICANT CHANGE UP (ref 3.3–5)
ALP SERPL-CCNC: 88 U/L — SIGNIFICANT CHANGE UP (ref 40–120)
ALT FLD-CCNC: 13 U/L — SIGNIFICANT CHANGE UP (ref 10–45)
ANION GAP SERPL CALC-SCNC: 14 MMOL/L — SIGNIFICANT CHANGE UP (ref 5–17)
APTT BLD: 22.1 SEC — LOW (ref 27.5–35.5)
AST SERPL-CCNC: 16 U/L — SIGNIFICANT CHANGE UP (ref 10–40)
BASOPHILS # BLD AUTO: 0.07 K/UL — SIGNIFICANT CHANGE UP (ref 0–0.2)
BASOPHILS NFR BLD AUTO: 0.9 % — SIGNIFICANT CHANGE UP (ref 0–2)
BILIRUB SERPL-MCNC: 0.4 MG/DL — SIGNIFICANT CHANGE UP (ref 0.2–1.2)
BLD GP AB SCN SERPL QL: NEGATIVE — SIGNIFICANT CHANGE UP
BUN SERPL-MCNC: 29 MG/DL — HIGH (ref 7–23)
CALCIUM SERPL-MCNC: 9.8 MG/DL — SIGNIFICANT CHANGE UP (ref 8.4–10.5)
CHLORIDE SERPL-SCNC: 110 MMOL/L — HIGH (ref 96–108)
CO2 SERPL-SCNC: 23 MMOL/L — SIGNIFICANT CHANGE UP (ref 22–31)
CREAT SERPL-MCNC: 0.95 MG/DL — SIGNIFICANT CHANGE UP (ref 0.5–1.3)
EOSINOPHIL # BLD AUTO: 0.12 K/UL — SIGNIFICANT CHANGE UP (ref 0–0.5)
EOSINOPHIL NFR BLD AUTO: 1.5 % — SIGNIFICANT CHANGE UP (ref 0–6)
GLUCOSE SERPL-MCNC: 101 MG/DL — HIGH (ref 70–99)
GRAM STN FLD: SIGNIFICANT CHANGE UP
HCT VFR BLD CALC: 34.3 % — LOW (ref 34.5–45)
HGB BLD-MCNC: 10.8 G/DL — LOW (ref 11.5–15.5)
IMM GRANULOCYTES NFR BLD AUTO: 0.2 % — SIGNIFICANT CHANGE UP (ref 0–1.5)
INR BLD: 1.12 — SIGNIFICANT CHANGE UP (ref 0.88–1.16)
LYMPHOCYTES # BLD AUTO: 1.8 K/UL — SIGNIFICANT CHANGE UP (ref 1–3.3)
LYMPHOCYTES # BLD AUTO: 22 % — SIGNIFICANT CHANGE UP (ref 13–44)
MCHC RBC-ENTMCNC: 29.3 PG — SIGNIFICANT CHANGE UP (ref 27–34)
MCHC RBC-ENTMCNC: 31.5 GM/DL — LOW (ref 32–36)
MCV RBC AUTO: 93.2 FL — SIGNIFICANT CHANGE UP (ref 80–100)
MONOCYTES # BLD AUTO: 0.86 K/UL — SIGNIFICANT CHANGE UP (ref 0–0.9)
MONOCYTES NFR BLD AUTO: 10.5 % — SIGNIFICANT CHANGE UP (ref 2–14)
NEUTROPHILS # BLD AUTO: 5.31 K/UL — SIGNIFICANT CHANGE UP (ref 1.8–7.4)
NEUTROPHILS NFR BLD AUTO: 64.9 % — SIGNIFICANT CHANGE UP (ref 43–77)
NRBC # BLD: 0 /100 WBCS — SIGNIFICANT CHANGE UP (ref 0–0)
PLATELET # BLD AUTO: 268 K/UL — SIGNIFICANT CHANGE UP (ref 150–400)
POTASSIUM SERPL-MCNC: 4.4 MMOL/L — SIGNIFICANT CHANGE UP (ref 3.5–5.3)
POTASSIUM SERPL-SCNC: 4.4 MMOL/L — SIGNIFICANT CHANGE UP (ref 3.5–5.3)
PROT SERPL-MCNC: 7 G/DL — SIGNIFICANT CHANGE UP (ref 6–8.3)
PROTHROM AB SERPL-ACNC: 13.4 SEC — SIGNIFICANT CHANGE UP (ref 10.6–13.6)
RBC # BLD: 3.68 M/UL — LOW (ref 3.8–5.2)
RBC # FLD: 12.7 % — SIGNIFICANT CHANGE UP (ref 10.3–14.5)
RH IG SCN BLD-IMP: POSITIVE — SIGNIFICANT CHANGE UP
SODIUM SERPL-SCNC: 147 MMOL/L — HIGH (ref 135–145)
SPECIMEN SOURCE: SIGNIFICANT CHANGE UP
WBC # BLD: 8.18 K/UL — SIGNIFICANT CHANGE UP (ref 3.8–10.5)
WBC # FLD AUTO: 8.18 K/UL — SIGNIFICANT CHANGE UP (ref 3.8–10.5)

## 2020-07-09 PROCEDURE — 73700 CT LOWER EXTREMITY W/O DYE: CPT | Mod: 26,LT

## 2020-07-09 PROCEDURE — 93010 ELECTROCARDIOGRAM REPORT: CPT

## 2020-07-09 PROCEDURE — 99214 OFFICE O/P EST MOD 30 MIN: CPT

## 2020-07-09 PROCEDURE — 99285 EMERGENCY DEPT VISIT HI MDM: CPT | Mod: 25

## 2020-07-09 PROCEDURE — 71045 X-RAY EXAM CHEST 1 VIEW: CPT | Mod: 26

## 2020-07-09 RX ORDER — ATORVASTATIN CALCIUM 80 MG/1
40 TABLET, FILM COATED ORAL AT BEDTIME
Refills: 0 | Status: DISCONTINUED | OUTPATIENT
Start: 2020-07-09 | End: 2020-07-10

## 2020-07-09 RX ORDER — PIPERACILLIN AND TAZOBACTAM 4; .5 G/20ML; G/20ML
3.38 INJECTION, POWDER, LYOPHILIZED, FOR SOLUTION INTRAVENOUS ONCE
Refills: 0 | Status: COMPLETED | OUTPATIENT
Start: 2020-07-09 | End: 2020-07-09

## 2020-07-09 RX ORDER — POLYETHYLENE GLYCOL 3350 17 G/17G
17 POWDER, FOR SOLUTION ORAL DAILY
Refills: 0 | Status: DISCONTINUED | OUTPATIENT
Start: 2020-07-09 | End: 2020-07-10

## 2020-07-09 RX ORDER — ASPIRIN/CALCIUM CARB/MAGNESIUM 324 MG
81 TABLET ORAL DAILY
Refills: 0 | Status: DISCONTINUED | OUTPATIENT
Start: 2020-07-10 | End: 2020-07-10

## 2020-07-09 RX ORDER — CLOPIDOGREL BISULFATE 75 MG/1
75 TABLET, FILM COATED ORAL ONCE
Refills: 0 | Status: COMPLETED | OUTPATIENT
Start: 2020-07-10 | End: 2020-07-10

## 2020-07-09 RX ORDER — VANCOMYCIN HCL 1 G
1000 VIAL (EA) INTRAVENOUS ONCE
Refills: 0 | Status: COMPLETED | OUTPATIENT
Start: 2020-07-09 | End: 2020-07-09

## 2020-07-09 RX ORDER — ACETAMINOPHEN 500 MG
650 TABLET ORAL ONCE
Refills: 0 | Status: COMPLETED | OUTPATIENT
Start: 2020-07-09 | End: 2020-07-09

## 2020-07-09 RX ORDER — VANCOMYCIN HCL 1 G
1000 VIAL (EA) INTRAVENOUS EVERY 24 HOURS
Refills: 0 | Status: DISCONTINUED | OUTPATIENT
Start: 2020-07-10 | End: 2020-07-10

## 2020-07-09 RX ORDER — SODIUM CHLORIDE 9 MG/ML
1000 INJECTION, SOLUTION INTRAVENOUS
Refills: 0 | Status: DISCONTINUED | OUTPATIENT
Start: 2020-07-09 | End: 2020-07-10

## 2020-07-09 RX ORDER — HEPARIN SODIUM 5000 [USP'U]/ML
5000 INJECTION INTRAVENOUS; SUBCUTANEOUS EVERY 8 HOURS
Refills: 0 | Status: DISCONTINUED | OUTPATIENT
Start: 2020-07-09 | End: 2020-07-10

## 2020-07-09 RX ORDER — PIPERACILLIN AND TAZOBACTAM 4; .5 G/20ML; G/20ML
3.38 INJECTION, POWDER, LYOPHILIZED, FOR SOLUTION INTRAVENOUS EVERY 6 HOURS
Refills: 0 | Status: DISCONTINUED | OUTPATIENT
Start: 2020-07-09 | End: 2020-07-10

## 2020-07-09 RX ORDER — SENNA PLUS 8.6 MG/1
2 TABLET ORAL AT BEDTIME
Refills: 0 | Status: DISCONTINUED | OUTPATIENT
Start: 2020-07-09 | End: 2020-07-10

## 2020-07-09 RX ADMIN — PIPERACILLIN AND TAZOBACTAM 200 GRAM(S): 4; .5 INJECTION, POWDER, LYOPHILIZED, FOR SOLUTION INTRAVENOUS at 13:26

## 2020-07-09 RX ADMIN — Medication 1000 MILLIGRAM(S): at 14:45

## 2020-07-09 RX ADMIN — PIPERACILLIN AND TAZOBACTAM 200 GRAM(S): 4; .5 INJECTION, POWDER, LYOPHILIZED, FOR SOLUTION INTRAVENOUS at 19:14

## 2020-07-09 RX ADMIN — PIPERACILLIN AND TAZOBACTAM 3.38 GRAM(S): 4; .5 INJECTION, POWDER, LYOPHILIZED, FOR SOLUTION INTRAVENOUS at 14:06

## 2020-07-09 RX ADMIN — SENNA PLUS 2 TABLET(S): 8.6 TABLET ORAL at 22:13

## 2020-07-09 RX ADMIN — HEPARIN SODIUM 5000 UNIT(S): 5000 INJECTION INTRAVENOUS; SUBCUTANEOUS at 22:13

## 2020-07-09 RX ADMIN — Medication 250 MILLIGRAM(S): at 14:02

## 2020-07-09 RX ADMIN — ATORVASTATIN CALCIUM 40 MILLIGRAM(S): 80 TABLET, FILM COATED ORAL at 22:13

## 2020-07-09 NOTE — H&P ADULT - HISTORY OF PRESENT ILLNESS
80F with PMH of HTN, PAD (s/p LLE BK Pop to DP bypass with rGSV on 5/7/20) presents from outpatient office due to concern for an infected LLE incision. Patient states her recovery had been fairly unremarkable with a return to normal function, as well as improvement in her LLE heel wound. About 1 week ago she and her  noticed green discharge coming out of her medial upper thigh incision that had a foul odor. She was seen by her PCP who prescribed her a course of Doxycycline. She was then seen in the vascular outpatient office and it was recommended that she come to the ED for admission for IV Abx and surgical I+D. She denies fevers/chills, CP/SOB, N/V, changes in bowel or urinary habits.    In the ED she was febrile, VSS. No leukocytosis, Hgb 10.8, Na 147, BMP otherwise unremarkable. CT non-con of LLE pending.

## 2020-07-09 NOTE — ED PROVIDER NOTE - OBJECTIVE STATEMENT
old hx-    80y female here for elective LLE fem-distal bypass for CLI. PMH HTN, PAD presenting earlier this week with painful ulceration on the left heel and 5th toe x1 month. 5/4: patient underwent diagnostic only, LLE angiogram via right groin access showing distal SFA/AK pop occluded, BK pop with mild atherosclerotic disease proximal L AT/TP trunk/peroneal/PT arteries severely diseased, all occluded shortly after origin. AT reconstitutes in distal lower leg into DP artery L DANIELLE: 0.26, R DANIELLE: 0.80  On 5/7 she underwent a BK pop to DP bypass and distal SFA and pop angioplasty/stent. Post operatively she was started on Aspirin and Plavix, Anderson dc'd and passed TOV, advanced to regular diet and tolerating. Patient is stable for discharge to [facility] 79 yo F with recent left fem distal bypass 7 weeks ago now with 1 week of increased pain and swelling in medial thigh- no fevers or chills no numbness or tingling of LLE  no focal motor weakness-  no calf tenderness or chest pain or sob     old hx-    80y female here for elective LLE fem-distal bypass for CLI. PMH HTN, PAD presenting earlier this week with painful ulceration on the left heel and 5th toe x1 month. 5/4: patient underwent diagnostic only, LLE angiogram via right groin access showing distal SFA/AK pop occluded, BK pop with mild atherosclerotic disease proximal L AT/TP trunk/peroneal/PT arteries severely diseased, all occluded shortly after origin. AT reconstitutes in distal lower leg into DP artery L DANIELLE: 0.26, R DANIELLE: 0.80  On 5/7 she underwent a BK pop to DP bypass and distal SFA and pop angioplasty/stent. Post operatively she was started on Aspirin and Plavix, Anderson dc'd and passed TOV, advanced to regular diet and tolerating. Patient is stable for discharge to [facility]

## 2020-07-09 NOTE — ED ADULT NURSE NOTE - NSIMPLEMENTINTERV_GEN_ALL_ED
Implemented All Fall Risk Interventions:  [place] to call system. Call bell, personal items and telephone within reach. Instruct patient to call for assistance. Room bathroom lighting operational. Non-slip footwear when patient is off stretcher. Physically safe environment: no spills, clutter or unnecessary equipment. Stretcher in lowest position, wheels locked, appropriate side rails in place. Provide visual cue, wrist band, yellow gown, etc. Monitor gait and stability. Monitor for mental status changes and reorient to person, place, and time. Review medications for side effects contributing to fall risk. Reinforce activity limits and safety measures with patient and family.

## 2020-07-09 NOTE — H&P ADULT - NSHPLABSRESULTS_GEN_ALL_CORE
10.8   8.18  )-----------( 268      ( 09 Jul 2020 14:04 )             34.3     07-09  147<H>  |  110<H>  |  29<H>  ----------------------------<  101<H>  4.4   |  23  |  0.95    Ca    9.8      09 Jul 2020 13:27    TPro  7.0  /  Alb  4.0  /  TBili  0.4  /  DBili  x   /  AST  16  /  ALT  13  /  AlkPhos  88  07-09  PT/INR - ( 09 Jul 2020 13:27 )   PT: 13.4 sec;   INR: 1.12     PTT - ( 09 Jul 2020 13:27 )  PTT:22.1 sec    Type + Screen (05.07.20 @ 10:00)    ABO Interpretation: B    Rh Interpretation: Positive    Antibody Screen: Negative

## 2020-07-09 NOTE — H&P ADULT - NSHPPHYSICALEXAM_GEN_ALL_CORE
Gen: NAD  Resp: Normal respiratory effort  Abd: Soft, NTND  Ext: LLE 1+ edema; LLE in upper thigh proximally clean and intact; distal medial thigh with mild erythema surrounding a pinpoint whole through which there is a small amount of purulent drainage, non-tender to palpation; below knee incisions clean and intact, no erythema, fluctuance, or drainage; LLE medial heel wound 1x1cm, no purulence, drainage, or erythema  Pulses:  LLE Fem 2+, Pop 1+, DP 2+, PT Bi  RLE: Fem 2+, Pop 2+, DP 1+, PT Bi

## 2020-07-09 NOTE — H&P ADULT - ASSESSMENT
80F with PMH of HTN, PAD (s/p LLE BK Pop to DP bypass with rGSV on 5/7/20) admitted for a LLE surgical incision draining infection.    - Admit to Vascular Surgery, Telemetry  - Home medications - Holding ARB/Thiazide  - IV Abx - Vanc/Zosyn  - COVID Swab STAT - Pre-op for OR  - CC Diet, NPO after midnight  - AM labs  - Pre-op for OR tomorrow for incision and drainage 80F with PMH of HTN, PAD (s/p LLE BK Pop to DP bypass with rGSV on 5/7/20) admitted for a LLE surgical incision draining infection.    - Admit to Vascular Surgery, Telemetry  - Home medications - Holding ARB/Thiazide  - IV Abx - Vanc/Zosyn  - COVID Swab STAT - Pre-op for OR  - DASH Diet, NPO after midnight  - AM labs  - Pre-op for OR tomorrow for incision and drainage 80F with PMH of HTN, PAD (s/p LLE BK Pop to DP bypass with rGSV on 5/7/20) admitted for a LLE surgical incision draining infection.    - Admit to Vascular Surgery, Telemetry  - Home medications - Holding ARB/Thiazide  - IV Abx - Vanc/Zosyn, f/u wound cultures  - COVID Swab STAT - Pre-op for OR  - DASH Diet, NPO after midnight  - AM labs  - f/u CT LLE Non-con  - Pre-op for OR tomorrow for incision and drainage

## 2020-07-09 NOTE — ED ADULT NURSE NOTE - OBJECTIVE STATEMENT
Pt is an 81 y/o female A&Ox4 in NAD ambulatory with steady gait sent in my MD fo infection of surgical site s/p vascular surgery of left medial lower extremity. Pt denies fever/chills, chest pain, SOB, N/V/D, pain. Upon assessment erythema and mild drainage noted to site. Pt unable to state date of surgery, pt and  agitated with having to go through ER for admission and reluctant to answer some of nurses questions, requesting "Dr Larsen's resident." Pt talking in clear, full sentences, respirations even and unlabored, Panamanian  used, ID # [id number]. Pt is an 81 y/o female A&Ox4 in NAD ambulatory with steady gait sent in my MD for infection of surgical site s/p vascular surgery of left medial lower extremity. Pt denies fever/chills, chest pain, SOB, N/V/D, pain. Upon assessment erythema and mild drainage noted to site. Pt unable to state date of surgery, pt and  agitated with having to go through ER for admission and reluctant to answer some of nurses questions, requesting "Dr Larsen's resident." Pt talking in clear, full sentences, respirations even and unlabored, Japanese  used, ID # [id number].

## 2020-07-09 NOTE — PROGRESS NOTE ADULT - SUBJECTIVE AND OBJECTIVE BOX
Pre-op Diagnosis: surgical incision infection  Procedure: L thigh I&D and vac placement  Surgeon: Aaliyah    Consent: Signed in chart                          10.8   8.18  )-----------( 268      ( 09 Jul 2020 14:04 )             34.3     07-09    147<H>  |  110<H>  |  29<H>  ----------------------------<  101<H>  4.4   |  23  |  0.95    Ca    9.8      09 Jul 2020 13:27    TPro  7.0  /  Alb  4.0  /  TBili  0.4  /  DBili  x   /  AST  16  /  ALT  13  /  AlkPhos  88  07-09  PT/INR - ( 09 Jul 2020 13:27 )   PT: 13.4 sec;   INR: 1.12     PTT - ( 09 Jul 2020 13:27 )  PTT:22.1 sec    Type & Screen: B pos Ab neg  CXR: no infiltrates  EKG: NSR    A/P: 80yFemale pre-op for above procedure    1. NPO past midnight, except medications  2. IVFlactated ringers. 1000 milliLiter(s) IV Continuous <Continuous>  3.Covid neg

## 2020-07-09 NOTE — ED ADULT TRIAGE NOTE - OTHER COMPLAINTS
patient presents ambulatory into ED sent by MD An s/p vascular surgery to L inner thigh--reports drainage from surgical site. denies fevers/chills. patient unsure of date of surgery.

## 2020-07-09 NOTE — ED PROVIDER NOTE - CLINICAL SUMMARY MEDICAL DECISION MAKING FREE TEXT BOX
lle pain swelling hx PAD  s/p left fem distal by pass  8 weeks ago with leg swelling  ? abscess spoke with vascular  admit  iv abx  or for I/D 81 yo F with left thigh pain/ erythema/ swelling along incision- s/p left fem distal by pass 7 weeks ago with leg swelling  ? abscess spoke with vascular  admit  iv abx  or for I/D

## 2020-07-09 NOTE — ED PROVIDER NOTE - CARE PLAN
Principal Discharge DX:	Leg pain, central, left  Secondary Diagnosis:	PAD (peripheral artery disease)

## 2020-07-09 NOTE — ED ADULT NURSE NOTE - CHIEF COMPLAINT QUOTE
wound Detail Level: Detailed Note Text (......Xxx Chief Complaint.): This diagnosis correlates with the

## 2020-07-10 ENCOUNTER — RESULT REVIEW (OUTPATIENT)
Age: 80
End: 2020-07-10

## 2020-07-10 DIAGNOSIS — I73.9 PERIPHERAL VASCULAR DISEASE, UNSPECIFIED: ICD-10-CM

## 2020-07-10 DIAGNOSIS — R50.9 FEVER, UNSPECIFIED: ICD-10-CM

## 2020-07-10 DIAGNOSIS — N18.3 CHRONIC KIDNEY DISEASE, STAGE 3 (MODERATE): ICD-10-CM

## 2020-07-10 DIAGNOSIS — M79.605 PAIN IN LEFT LEG: ICD-10-CM

## 2020-07-10 DIAGNOSIS — I10 ESSENTIAL (PRIMARY) HYPERTENSION: ICD-10-CM

## 2020-07-10 DIAGNOSIS — D64.9 ANEMIA, UNSPECIFIED: ICD-10-CM

## 2020-07-10 LAB
ANION GAP SERPL CALC-SCNC: 13 MMOL/L — SIGNIFICANT CHANGE UP (ref 5–17)
BUN SERPL-MCNC: 21 MG/DL — SIGNIFICANT CHANGE UP (ref 7–23)
CALCIUM SERPL-MCNC: 9.3 MG/DL — SIGNIFICANT CHANGE UP (ref 8.4–10.5)
CHLORIDE SERPL-SCNC: 111 MMOL/L — HIGH (ref 96–108)
CO2 SERPL-SCNC: 23 MMOL/L — SIGNIFICANT CHANGE UP (ref 22–31)
CREAT SERPL-MCNC: 1.08 MG/DL — SIGNIFICANT CHANGE UP (ref 0.5–1.3)
GLUCOSE SERPL-MCNC: 125 MG/DL — HIGH (ref 70–99)
GRAM STN FLD: SIGNIFICANT CHANGE UP
HCT VFR BLD CALC: 33.3 % — LOW (ref 34.5–45)
HGB BLD-MCNC: 10.7 G/DL — LOW (ref 11.5–15.5)
MAGNESIUM SERPL-MCNC: 2.2 MG/DL — SIGNIFICANT CHANGE UP (ref 1.6–2.6)
MCHC RBC-ENTMCNC: 29.6 PG — SIGNIFICANT CHANGE UP (ref 27–34)
MCHC RBC-ENTMCNC: 32.1 GM/DL — SIGNIFICANT CHANGE UP (ref 32–36)
MCV RBC AUTO: 92.2 FL — SIGNIFICANT CHANGE UP (ref 80–100)
NRBC # BLD: 0 /100 WBCS — SIGNIFICANT CHANGE UP (ref 0–0)
PHOSPHATE SERPL-MCNC: 2.8 MG/DL — SIGNIFICANT CHANGE UP (ref 2.5–4.5)
PLATELET # BLD AUTO: 269 K/UL — SIGNIFICANT CHANGE UP (ref 150–400)
POTASSIUM SERPL-MCNC: 4.2 MMOL/L — SIGNIFICANT CHANGE UP (ref 3.5–5.3)
POTASSIUM SERPL-SCNC: 4.2 MMOL/L — SIGNIFICANT CHANGE UP (ref 3.5–5.3)
RBC # BLD: 3.61 M/UL — LOW (ref 3.8–5.2)
RBC # FLD: 12.7 % — SIGNIFICANT CHANGE UP (ref 10.3–14.5)
SARS-COV-2 IGG SERPL QL IA: NEGATIVE — SIGNIFICANT CHANGE UP
SARS-COV-2 IGM SERPL IA-ACNC: <0.1 INDEX — SIGNIFICANT CHANGE UP
SARS-COV-2 RNA SPEC QL NAA+PROBE: SIGNIFICANT CHANGE UP
SARS-COV-2 RNA SPEC QL NAA+PROBE: SIGNIFICANT CHANGE UP
SODIUM SERPL-SCNC: 147 MMOL/L — HIGH (ref 135–145)
SPECIMEN SOURCE: SIGNIFICANT CHANGE UP
WBC # BLD: 7.76 K/UL — SIGNIFICANT CHANGE UP (ref 3.8–10.5)
WBC # FLD AUTO: 7.76 K/UL — SIGNIFICANT CHANGE UP (ref 3.8–10.5)

## 2020-07-10 PROCEDURE — 99223 1ST HOSP IP/OBS HIGH 75: CPT

## 2020-07-10 PROCEDURE — 10061 I&D ABSCESS COMP/MULTIPLE: CPT | Mod: 59,GC

## 2020-07-10 PROCEDURE — 11043 DBRDMT MUSC&/FSCA 1ST 20/<: CPT | Mod: 59,GC

## 2020-07-10 PROCEDURE — 97606 NEG PRS WND THER DME>50 SQCM: CPT | Mod: 59,GC

## 2020-07-10 PROCEDURE — 88304 TISSUE EXAM BY PATHOLOGIST: CPT | Mod: 26

## 2020-07-10 RX ORDER — PIPERACILLIN AND TAZOBACTAM 4; .5 G/20ML; G/20ML
3.38 INJECTION, POWDER, LYOPHILIZED, FOR SOLUTION INTRAVENOUS EVERY 6 HOURS
Refills: 0 | Status: DISCONTINUED | OUTPATIENT
Start: 2020-07-10 | End: 2020-07-12

## 2020-07-10 RX ORDER — SENNA PLUS 8.6 MG/1
2 TABLET ORAL AT BEDTIME
Refills: 0 | Status: DISCONTINUED | OUTPATIENT
Start: 2020-07-10 | End: 2020-07-14

## 2020-07-10 RX ORDER — CLOPIDOGREL BISULFATE 75 MG/1
75 TABLET, FILM COATED ORAL DAILY
Refills: 0 | Status: DISCONTINUED | OUTPATIENT
Start: 2020-07-10 | End: 2020-07-14

## 2020-07-10 RX ORDER — VANCOMYCIN HCL 1 G
1000 VIAL (EA) INTRAVENOUS EVERY 24 HOURS
Refills: 0 | Status: DISCONTINUED | OUTPATIENT
Start: 2020-07-10 | End: 2020-07-12

## 2020-07-10 RX ORDER — CLOPIDOGREL BISULFATE 75 MG/1
75 TABLET, FILM COATED ORAL DAILY
Refills: 0 | Status: DISCONTINUED | OUTPATIENT
Start: 2020-07-10 | End: 2020-07-10

## 2020-07-10 RX ORDER — SODIUM,POTASSIUM PHOSPHATES 278-250MG
2 POWDER IN PACKET (EA) ORAL
Refills: 0 | Status: DISCONTINUED | OUTPATIENT
Start: 2020-07-10 | End: 2020-07-10

## 2020-07-10 RX ORDER — HEPARIN SODIUM 5000 [USP'U]/ML
5000 INJECTION INTRAVENOUS; SUBCUTANEOUS EVERY 8 HOURS
Refills: 0 | Status: DISCONTINUED | OUTPATIENT
Start: 2020-07-10 | End: 2020-07-14

## 2020-07-10 RX ORDER — ATORVASTATIN CALCIUM 80 MG/1
40 TABLET, FILM COATED ORAL AT BEDTIME
Refills: 0 | Status: DISCONTINUED | OUTPATIENT
Start: 2020-07-10 | End: 2020-07-14

## 2020-07-10 RX ORDER — ASPIRIN/CALCIUM CARB/MAGNESIUM 324 MG
81 TABLET ORAL DAILY
Refills: 0 | Status: DISCONTINUED | OUTPATIENT
Start: 2020-07-10 | End: 2020-07-14

## 2020-07-10 RX ORDER — POLYETHYLENE GLYCOL 3350 17 G/17G
17 POWDER, FOR SOLUTION ORAL DAILY
Refills: 0 | Status: DISCONTINUED | OUTPATIENT
Start: 2020-07-10 | End: 2020-07-14

## 2020-07-10 RX ADMIN — ATORVASTATIN CALCIUM 40 MILLIGRAM(S): 80 TABLET, FILM COATED ORAL at 21:39

## 2020-07-10 RX ADMIN — PIPERACILLIN AND TAZOBACTAM 200 GRAM(S): 4; .5 INJECTION, POWDER, LYOPHILIZED, FOR SOLUTION INTRAVENOUS at 21:39

## 2020-07-10 RX ADMIN — Medication 250 MILLIGRAM(S): at 15:26

## 2020-07-10 RX ADMIN — HEPARIN SODIUM 5000 UNIT(S): 5000 INJECTION INTRAVENOUS; SUBCUTANEOUS at 05:30

## 2020-07-10 RX ADMIN — SODIUM CHLORIDE 75 MILLILITER(S): 9 INJECTION, SOLUTION INTRAVENOUS at 00:00

## 2020-07-10 RX ADMIN — SENNA PLUS 2 TABLET(S): 8.6 TABLET ORAL at 21:39

## 2020-07-10 RX ADMIN — CLOPIDOGREL BISULFATE 75 MILLIGRAM(S): 75 TABLET, FILM COATED ORAL at 15:25

## 2020-07-10 RX ADMIN — HEPARIN SODIUM 5000 UNIT(S): 5000 INJECTION INTRAVENOUS; SUBCUTANEOUS at 21:39

## 2020-07-10 RX ADMIN — Medication 81 MILLIGRAM(S): at 15:25

## 2020-07-10 RX ADMIN — PIPERACILLIN AND TAZOBACTAM 200 GRAM(S): 4; .5 INJECTION, POWDER, LYOPHILIZED, FOR SOLUTION INTRAVENOUS at 05:30

## 2020-07-10 RX ADMIN — PIPERACILLIN AND TAZOBACTAM 200 GRAM(S): 4; .5 INJECTION, POWDER, LYOPHILIZED, FOR SOLUTION INTRAVENOUS at 01:12

## 2020-07-10 RX ADMIN — CLOPIDOGREL BISULFATE 75 MILLIGRAM(S): 75 TABLET, FILM COATED ORAL at 05:30

## 2020-07-10 RX ADMIN — PIPERACILLIN AND TAZOBACTAM 200 GRAM(S): 4; .5 INJECTION, POWDER, LYOPHILIZED, FOR SOLUTION INTRAVENOUS at 14:46

## 2020-07-10 NOTE — ASSESSMENT
[FreeTextEntry1] : 81 yo F with LLE ischemic rest pain and non-healing ulcer Pop-Dp bypass on 5/7/20 returns for a post op visit.\par Patient with a small area of non-healing left thigh incision draining purulent discharge.\par Patient also c/o surrounding tenderness.\par We recommend for the patient to be admitted to the hospital for IV antibiotics and possible thigh incision exploration.\par Patient and her spouse agreed and will go to ED to be admitted.

## 2020-07-10 NOTE — CONSULT NOTE ADULT - SUBJECTIVE AND OBJECTIVE BOX
Patient is a 80y old  Female who presents with a chief complaint of L surgical incision infection (10 Jul 2020 05:10)      HPI:  80F with PMH of HTN, PAD (s/p LLE BK Pop to DP bypass with rGSV on 5/7/20) presents from outpatient office due to concern for an infected LLE incision. Patient states her recovery had been fairly unremarkable with a return to normal function, as well as improvement in her LLE heel wound. About 1 week ago she and her  noticed green discharge coming out of her medial upper thigh incision that had a foul odor. She was seen by her PCP who prescribed her a course of Doxycycline. She was then seen in the vascular outpatient office and it was recommended that she come to the ED for admission for IV Abx and surgical I+D. She denies fevers/chills, CP/SOB, N/V, changes in bowel or urinary habits.  Pt now s/p debridement    Subjective:  Pt feels well - has some pain at L thigh. 12 pt ROS is otherwise negative.     Allergies    No Known Allergies    Intolerances    Home meds:      MEDICATIONS  (STANDING):  aspirin enteric coated 81 milliGRAM(s) Oral daily  atorvastatin 40 milliGRAM(s) Oral at bedtime  clopidogrel Tablet 75 milliGRAM(s) Oral daily  heparin   Injectable 5000 Unit(s) SubCutaneous every 8 hours  piperacillin/tazobactam IVPB.. 3.375 Gram(s) IV Intermittent every 6 hours  polyethylene glycol 3350 17 Gram(s) Oral daily  senna 2 Tablet(s) Oral at bedtime  vancomycin  IVPB 1000 milliGRAM(s) IV Intermittent every 24 hours    MEDICATIONS  (PRN):      Drug Dosing Weight  Height (cm): 165 (10 Jul 2020 10:26)  Weight (kg): 76 (10 Jul 2020 10:26)  BMI (kg/m2): 27.9 (10 Jul 2020 10:26)  BSA (m2): 1.83 (10 Jul 2020 10:26)    PAST MEDICAL & SURGICAL HISTORY:  Thyroid disease  HTN (hypertension)  PAD (peripheral artery disease): ischemic ulcer  Ankle fracture      FAMILY HISTORY:  no cardiac disease    SOCIAL HISTORY:  never smoker    Vital Signs Last 24 Hrs  T(C): 36.8 (10 Jul 2020 18:15), Max: 36.8 (10 Jul 2020 08:53)  T(F): 98.3 (10 Jul 2020 18:15), Max: 98.3 (10 Jul 2020 08:53)  HR: 56 (10 Jul 2020 17:00) (54 - 71)  BP: 102/55 (10 Jul 2020 17:00) (90/50 - 146/67)  BP(mean): 77 (10 Jul 2020 17:00) (64 - 96)  ABP: --  ABP(mean): --  RR: 181 (10 Jul 2020 17:00) (16 - 181)  SpO2: 94% (10 Jul 2020 17:00) (93% - 100%)          I&O's Detail    09 Jul 2020 07:01  -  10 Jul 2020 07:00  --------------------------------------------------------  IN:    IV PiggyBack: 100 mL  Total IN: 100 mL    OUT:  Total OUT: 0 mL    Total NET: 100 mL      10 Jul 2020 07:01  -  10 Jul 2020 19:21  --------------------------------------------------------  IN:  Total IN: 0 mL    OUT:    Voided: 200 mL  Total OUT: 200 mL    Total NET: -200 mL          PHYSICAL EXAM:    Constitutional: NAD  Eyes: PERRLA  ENMT: MMM  Neck: supple  Back: midline  Respiratory: CTA b/l  Cardiovascular: rrr, s1s2, no m/r/g  Gastrointestinal: soft, NTND, + BS  Extremities: L thigh wound vac, LLE edema  Vascular: + 2 pulses radial  Neurological: AAO x 4  Skin: norash  Lymph Nodes: no LAD  Musculoskeletal: no joint swelling  Psychiatric: normal affect        LABS:  CBC Full  -  ( 10 Jul 2020 08:10 )  WBC Count : 7.76 K/uL  RBC Count : 3.61 M/uL  Hemoglobin : 10.7 g/dL  Hematocrit : 33.3 %  Platelet Count - Automated : 269 K/uL  Mean Cell Volume : 92.2 fl  Mean Cell Hemoglobin : 29.6 pg  Mean Cell Hemoglobin Concentration : 32.1 gm/dL  Auto Neutrophil # : x  Auto Lymphocyte # : x  Auto Monocyte # : x  Auto Eosinophil # : x  Auto Basophil # : x  Auto Neutrophil % : x  Auto Lymphocyte % : x  Auto Monocyte % : x  Auto Eosinophil % : x  Auto Basophil % : x    07-10    147<H>  |  111<H>  |  21  ----------------------------<  125<H>  4.2   |  23  |  1.08    Ca    9.3      10 Jul 2020 08:10  Phos  2.8     07-10  Mg     2.2     07-10    TPro  7.0  /  Alb  4.0  /  TBili  0.4  /  DBili  x   /  AST  16  /  ALT  13  /  AlkPhos  88  07-09    CAPILLARY BLOOD GLUCOSE        PT/INR - ( 09 Jul 2020 13:27 )   PT: 13.4 sec;   INR: 1.12          PTT - ( 09 Jul 2020 13:27 )  PTT:22.1 sec      EKG:  NSR    ECHO, US:  TTE 5/2020:   1. Normal left and right ventricular size and systolic function.   2. Grade II left ventricular diastolic dysfunction with elevated filling pressure.   3. Mild symmetric left ventricular hypertrophy.   4. Aortic sclerosis without significant stenosis.   5. Pulmonary hypertension present, pulmonary artery systolic pressure is 37 mmHg.   6. No pericardial effusion.    RADIOLOGY:  CT:  2.4 x 5.8 x 12.1 cm low-density collection in the left medial thigh could represent postoperative hematoma, seroma, or abscess. In the setting of infection abscess should be strongly considered. No medication with vascular system is identified on this noncontrast examination.

## 2020-07-10 NOTE — REVIEW OF SYSTEMS
[Lower Ext Edema] : lower extremity edema [As Noted in HPI] : as noted in HPI [Skin Wound] : skin wound [Negative] : Heme/Lymph [Chills] : no chills [Fever] : no fever [Leg Claudication] : no intermittent leg claudication

## 2020-07-10 NOTE — BRIEF OPERATIVE NOTE - NSICDXBRIEFPROCEDURE_GEN_ALL_CORE_FT
PROCEDURES:  Wound VAC dressing change for area 50 sq cm or less 10-Jul-2020 13:05:40  Alton Nelson  Complex incision and drainage of wound infection 10-Jul-2020 13:05:22  Alton Nelson

## 2020-07-10 NOTE — BRIEF OPERATIVE NOTE - OPERATION/FINDINGS
Incision and debridement of L upper thigh abscess overlying prior surgical site, revealing ~30cc of purulence. Abscess cavity tracks proximally up the thigh ~4cm. Wound bed irrigated with 3L saline via pulse . Wound vac placed.

## 2020-07-10 NOTE — PHYSICAL EXAM
[2+] : left 2+ [1+] : left 1+ [Ankle Swelling (On Exam)] : present [Ankle Swelling On The Left] : moderate [Alert] : alert [Calm] : calm [JVD] : no jugular venous distention  [] : not present [Varicose Veins Of Lower Extremities] : not present [Abdomen Tenderness] : ~T ~M No abdominal tenderness [de-identified] : LLE: + well healing incision over medial thigh, calf and dorsum of foot. small opening in the medial thigh about 1x1cm, probes about 1cm deep,+ purulent drainage. + surrounding fullness/induration.Left groin incision slightly open/macerated. [de-identified] : WN/WD, NAD, on a wheelchair [de-identified] : NC/AT

## 2020-07-10 NOTE — PROGRESS NOTE ADULT - SUBJECTIVE AND OBJECTIVE BOX
O/N: SONIA< VSS preopped and consented, Covid re-swabbed->neg                                    80F with PMH of HTN, PAD (s/p LLE BK Pop to DP bypass with rGSV on 5/7/20) admitted for a LLE surgical incision draining infection.    - Home medications - Holding ARB/Thiazide  - IV Abx - Vanc/Zosyn, f/u wound cultures  - NPO   - AM labs  -OR today O/N: SONIA< VSS preopped and consented, Covid re-swabbed->neg      Admits to pain in left thigh at site of wound   piperacillin/tazobactam IVPB.. 3.375  vancomycin  IVPB 1000  aspirin enteric coated 81  clopidogrel Tablet 75  heparin   Injectable 5000  piperacillin/tazobactam IVPB.. 3.375  vancomycin  IVPB 1000      Allergies    No Known Allergies    Intolerances        Vital Signs Last 24 Hrs  T(C): 36.8 (10 Jul 2020 10:26), Max: 38.5 (09 Jul 2020 15:59)  T(F): 98.3 (10 Jul 2020 10:26), Max: 101.3 (09 Jul 2020 15:59)  HR: 64 (10 Jul 2020 10:26) (63 - 87)  BP: 117/72 (10 Jul 2020 10:26) (93/46 - 155/73)  BP(mean): 96 (10 Jul 2020 05:41) (67 - 96)  RR: 18 (10 Jul 2020 10:26) (17 - 20)  SpO2: 97% (10 Jul 2020 10:26) (97% - 100%)  I&O's Summary    09 Jul 2020 07:01  -  10 Jul 2020 07:00  --------------------------------------------------------  IN: 100 mL / OUT: 0 mL / NET: 100 mL        Physical Exam:  General:NAD  Pulmonary: b/l breath sounds   Cardiovascular: s1s2 RRR   Abdominal: soft   Extremities: left thigh with incision. small area of drainage noted with slight exudate    mild surrounding erythema       LABS:                        10.7   7.76  )-----------( 269      ( 10 Jul 2020 08:10 )             33.3     07-10    147<H>  |  111<H>  |  21  ----------------------------<  125<H>  4.2   |  23  |  1.08    Ca    9.3      10 Jul 2020 08:10  Phos  2.8     07-10  Mg     2.2     07-10    TPro  7.0  /  Alb  4.0  /  TBili  0.4  /  DBili  x   /  AST  16  /  ALT  13  /  AlkPhos  88  07-09    PT/INR - ( 09 Jul 2020 13:27 )   PT: 13.4 sec;   INR: 1.12          PTT - ( 09 Jul 2020 13:27 )  PTT:22.1 sec    Radiology and Additional Studies:            ---------------------------------------------------------------------------  PLEASE CHECK WHEN PRESENT:     [  ]Heart Failure     [  ] Acute     [  ] Acute on Chronic     [  ] Chronic  -------------------------------------------------------------------     [  ]Diastolic [HFpEF]     [  ]Systolic [HFrEF]     [  ]Combined [HFpEF & HFrEF]  .................................................................................     [  ]Other:     [ ] Pulmonary Hypertension     [ ] Afib     [ x] Hypertensive Heart Disease  -------------------------------------------------------------------  [ ] Respiratory failure  [ ] Acute cor pulmonale  [ ] Asthma/COPD Exacerbation  [ ] Pleural effusion  [ ] Aspiration pneumonia  [ ] Obstructive Sleep Apnea  -------------------------------------------------------------------  [  ]MANOJ     [  ]ATN     [  ]Reneal Medullary Necrosis     [  ]Renal Cortical Necrosis     [  ]Other Pathological Lesions:    [  ]CKD 1  [  ]CKD 2  [  ]CKD 3  [  ]CKD 4  [  ]CKD 5  [  ]Other  -------------------------------------------------------------------  [  ]Diabetes  [  ] Diabetic PVD Ulcer  [  ] Neuropathic ulcer to DM  [  ] Diabetes with Nephropathy  [  ] Osteomyelitis due to diabetes  --------------------------------------------------------------------  [  ]Malnutrition: See Nutrition Note  [  ]Cachexia  [  ]Other:   [  ]Supplement Ordered:  [  ]Morbid Obesity (BMI >=40]  ---------------------------------------------------------------------  [ ] Sepsis/severe sepsis/septic shock  [ ] Noninfectious SIRS  [ ] UTI  [ ] Pneumonia  -----------------------------------------------------------------------  [ ] Acidosis/alkalosis  [ ] Fluid overload  [ ] Hypokalemia  [ ] Hyperkalemia  [ ] Hypomagnesemia  [ ] Hypophosphatemia  [ ] Hyperphosphatemia  ------------------------------------------------------------------------  [ ] Acute blood loss anemia  [ ] Post op blood loss anemia  [ ] Iron deficiency anemia  [ ] Anemia due to chronic disease  [ ] Hypercoagulable state  [ ] Thrombocytopenia  ----------------------------------------------------------------------  [ ] Cerebral infarction  [ ] Transient ischemia attack  [ ] Encephalopathy - Toxic or Metabolic            80F with PMH of HTN, PAD (s/p LLE BK Pop to DP bypass with rGSV on 5/7/20) admitted for a LLE surgical incision draining infection.    - Home medications - Holding ARB/Thiazide  - IV Abx - Vanc/Zosyn, f/u wound cultures  - NPO   - AM labs  -OR today

## 2020-07-10 NOTE — HISTORY OF PRESENT ILLNESS
[FreeTextEntry1] : 79 yo F with LLE ischemic rest pain and non-healing ulcer Pop-Dp bypass on 5/7/20 returns for a post op visit. Pt continues to experience mild pain along the incision and leg edema. Small opening in the medial thigh and in the left groin and states that "green" drainage is coming off. Patient went to see her PCP, Dr. Ramos, who took wound cultures and started her on antibiotics. Her medial heel ulcer is healing. She ambulates around her house. No fever, chills.

## 2020-07-10 NOTE — CONSULT NOTE ADULT - ASSESSMENT
80F with PMH of HTN, PAD (s/p LLE BK Pop to DP bypass with rGSV on 5/7/20) presents from outpatient office due to concern for an infected LLE incision now s/p debriedment

## 2020-07-11 LAB
ALBUMIN SERPL ELPH-MCNC: 3.3 G/DL — SIGNIFICANT CHANGE UP (ref 3.3–5)
ALP SERPL-CCNC: 68 U/L — SIGNIFICANT CHANGE UP (ref 40–120)
ALT FLD-CCNC: 9 U/L — LOW (ref 10–45)
ANION GAP SERPL CALC-SCNC: 13 MMOL/L — SIGNIFICANT CHANGE UP (ref 5–17)
AST SERPL-CCNC: 13 U/L — SIGNIFICANT CHANGE UP (ref 10–40)
BILIRUB SERPL-MCNC: 0.4 MG/DL — SIGNIFICANT CHANGE UP (ref 0.2–1.2)
BUN SERPL-MCNC: 17 MG/DL — SIGNIFICANT CHANGE UP (ref 7–23)
CALCIUM SERPL-MCNC: 9.2 MG/DL — SIGNIFICANT CHANGE UP (ref 8.4–10.5)
CHLORIDE SERPL-SCNC: 110 MMOL/L — HIGH (ref 96–108)
CO2 SERPL-SCNC: 23 MMOL/L — SIGNIFICANT CHANGE UP (ref 22–31)
CREAT SERPL-MCNC: 1.01 MG/DL — SIGNIFICANT CHANGE UP (ref 0.5–1.3)
GLUCOSE SERPL-MCNC: 93 MG/DL — SIGNIFICANT CHANGE UP (ref 70–99)
HCT VFR BLD CALC: 31.7 % — LOW (ref 34.5–45)
HGB BLD-MCNC: 10 G/DL — LOW (ref 11.5–15.5)
MAGNESIUM SERPL-MCNC: 2.1 MG/DL — SIGNIFICANT CHANGE UP (ref 1.6–2.6)
MCHC RBC-ENTMCNC: 29.5 PG — SIGNIFICANT CHANGE UP (ref 27–34)
MCHC RBC-ENTMCNC: 31.5 GM/DL — LOW (ref 32–36)
MCV RBC AUTO: 93.5 FL — SIGNIFICANT CHANGE UP (ref 80–100)
NRBC # BLD: 0 /100 WBCS — SIGNIFICANT CHANGE UP (ref 0–0)
PHOSPHATE SERPL-MCNC: 3.1 MG/DL — SIGNIFICANT CHANGE UP (ref 2.5–4.5)
PLATELET # BLD AUTO: 245 K/UL — SIGNIFICANT CHANGE UP (ref 150–400)
POTASSIUM SERPL-MCNC: 4.1 MMOL/L — SIGNIFICANT CHANGE UP (ref 3.5–5.3)
POTASSIUM SERPL-SCNC: 4.1 MMOL/L — SIGNIFICANT CHANGE UP (ref 3.5–5.3)
PROT SERPL-MCNC: 6.1 G/DL — SIGNIFICANT CHANGE UP (ref 6–8.3)
RBC # BLD: 3.39 M/UL — LOW (ref 3.8–5.2)
RBC # FLD: 12.8 % — SIGNIFICANT CHANGE UP (ref 10.3–14.5)
SODIUM SERPL-SCNC: 146 MMOL/L — HIGH (ref 135–145)
WBC # BLD: 6.79 K/UL — SIGNIFICANT CHANGE UP (ref 3.8–10.5)
WBC # FLD AUTO: 6.79 K/UL — SIGNIFICANT CHANGE UP (ref 3.8–10.5)

## 2020-07-11 PROCEDURE — 99223 1ST HOSP IP/OBS HIGH 75: CPT

## 2020-07-11 RX ORDER — ACETAMINOPHEN 500 MG
650 TABLET ORAL EVERY 6 HOURS
Refills: 0 | Status: DISCONTINUED | OUTPATIENT
Start: 2020-07-11 | End: 2020-07-14

## 2020-07-11 RX ADMIN — PIPERACILLIN AND TAZOBACTAM 200 GRAM(S): 4; .5 INJECTION, POWDER, LYOPHILIZED, FOR SOLUTION INTRAVENOUS at 09:51

## 2020-07-11 RX ADMIN — Medication 250 MILLIGRAM(S): at 13:48

## 2020-07-11 RX ADMIN — PIPERACILLIN AND TAZOBACTAM 200 GRAM(S): 4; .5 INJECTION, POWDER, LYOPHILIZED, FOR SOLUTION INTRAVENOUS at 15:44

## 2020-07-11 RX ADMIN — POLYETHYLENE GLYCOL 3350 17 GRAM(S): 17 POWDER, FOR SOLUTION ORAL at 11:44

## 2020-07-11 RX ADMIN — PIPERACILLIN AND TAZOBACTAM 200 GRAM(S): 4; .5 INJECTION, POWDER, LYOPHILIZED, FOR SOLUTION INTRAVENOUS at 21:31

## 2020-07-11 RX ADMIN — HEPARIN SODIUM 5000 UNIT(S): 5000 INJECTION INTRAVENOUS; SUBCUTANEOUS at 13:18

## 2020-07-11 RX ADMIN — CLOPIDOGREL BISULFATE 75 MILLIGRAM(S): 75 TABLET, FILM COATED ORAL at 11:44

## 2020-07-11 RX ADMIN — HEPARIN SODIUM 5000 UNIT(S): 5000 INJECTION INTRAVENOUS; SUBCUTANEOUS at 04:42

## 2020-07-11 RX ADMIN — Medication 650 MILLIGRAM(S): at 14:16

## 2020-07-11 RX ADMIN — PIPERACILLIN AND TAZOBACTAM 200 GRAM(S): 4; .5 INJECTION, POWDER, LYOPHILIZED, FOR SOLUTION INTRAVENOUS at 03:41

## 2020-07-11 RX ADMIN — Medication 81 MILLIGRAM(S): at 11:44

## 2020-07-11 NOTE — PROGRESS NOTE ADULT - SUBJECTIVE AND OBJECTIVE BOX
O/N: SONIA< VSS                              80F with PMH of HTN, PAD (s/p LLE BK Pop to DP bypass with rGSV on 5/7/20) admitted for a LLE surgical incision draining infection.    - Home medications - Holding ARB/Thiazide  - IV Abx - Vanc/Zosyn, f/u wound cultures  - NPO   - AM labs  -OR today O/N: SONIA< VSS      Patient was seen and examined at bedside. No acute event overnight. No complaints.      Vital Signs Last 24 Hrs  T(C): 36.3 (11 Jul 2020 09:28), Max: 36.8 (10 Jul 2020 18:15)  T(F): 97.4 (11 Jul 2020 09:28), Max: 98.3 (10 Jul 2020 18:15)  HR: 70 (11 Jul 2020 12:00) (54 - 102)  BP: 133/61 (11 Jul 2020 12:00) (90/50 - 133/64)  BP(mean): 88 (11 Jul 2020 12:00) (64 - 90)  RR: 17 (11 Jul 2020 12:00) (16 - 181)  SpO2: 98% (11 Jul 2020 12:00) (93% - 100%)    Physical Exam:  General: NAD  Pulmonary: Nonlabored breathing, no respiratory distress  Cardiovascular: NSR  Abdominal: soft, NT/ND  Extremities: WWP, normal strength, LLE wound with reduced redness in thigh, VAC in place with good suction, LLE heel wound 1x1 cm with no redness, no edema. Applied Protective dressingwith kerlix   Neuro: A/O x3, no focal deficits, normal sensation    Lines/drains/tubes:    I&O's Summary    10 Jul 2020 07:01  -  11 Jul 2020 07:00  --------------------------------------------------------  IN: 300 mL / OUT: 1100 mL / NET: -800 mL        LABS:                        10.0   6.79  )-----------( 245      ( 11 Jul 2020 09:19 )             31.7     07-11    146<H>  |  110<H>  |  17  ----------------------------<  93  4.1   |  23  |  1.01    Ca    9.2      11 Jul 2020 09:19  Phos  3.1     07-11  Mg     2.1     07-11    TPro  6.1  /  Alb  3.3  /  TBili  0.4  /  DBili  x   /  AST  13  /  ALT  9<L>  /  AlkPhos  68  07-11    PT/INR - ( 09 Jul 2020 13:27 )   PT: 13.4 sec;   INR: 1.12          PTT - ( 09 Jul 2020 13:27 )  PTT:22.1 sec    CAPILLARY BLOOD GLUCOSE        LIVER FUNCTIONS - ( 11 Jul 2020 09:19 )  Alb: 3.3 g/dL / Pro: 6.1 g/dL / ALK PHOS: 68 U/L / ALT: 9 U/L / AST: 13 U/L / GGT: x             RADIOLOGY & ADDITIONAL STUDIES:    ---------------------------------------------------------------------------  PLEASE CHECK WHEN PRESENT:     [  ]Heart Failure     [  ] Acute     [  ] Acute on Chronic     [  ] Chronic  -------------------------------------------------------------------     [  ]Diastolic [HFpEF]     [  ]Systolic [HFrEF]     [  ]Combined [HFpEF & HFrEF]  .................................................................................     [  ]Other:     [ ] Pulmonary Hypertension     [ ] Afib     [ x] Hypertensive Heart Disease  -------------------------------------------------------------------  [ ] Respiratory failure  [ ] Acute cor pulmonale  [ ] Asthma/COPD Exacerbation  [ ] Pleural effusion  [ ] Aspiration pneumonia  [ ] Obstructive Sleep Apnea  -------------------------------------------------------------------  [  ]MANOJ     [  ]ATN     [  ]Reneal Medullary Necrosis     [  ]Renal Cortical Necrosis     [  ]Other Pathological Lesions:    [  ]CKD 1  [  ]CKD 2  [  ]CKD 3  [  ]CKD 4  [  ]CKD 5  [  ]Other  -------------------------------------------------------------------  [  ]Diabetes  [  ] Diabetic PVD Ulcer  [  ] Neuropathic ulcer to DM  [  ] Diabetes with Nephropathy  [  ] Osteomyelitis due to diabetes  --------------------------------------------------------------------  [  ]Malnutrition: See Nutrition Note  [  ]Cachexia  [  ]Other:   [  ]Supplement Ordered:  [  ]Morbid Obesity (BMI >=40]  ---------------------------------------------------------------------  [ ] Sepsis/severe sepsis/septic shock  [ ] Noninfectious SIRS  [ ] UTI  [ ] Pneumonia  -----------------------------------------------------------------------  [ ] Acidosis/alkalosis  [ ] Fluid overload  [ ] Hypokalemia  [ ] Hyperkalemia  [ ] Hypomagnesemia  [ ] Hypophosphatemia  [ ] Hyperphosphatemia  ------------------------------------------------------------------------  [ ] Acute blood loss anemia  [ ] Post op blood loss anemia  [ ] Iron deficiency anemia  [ ] Anemia due to chronic disease  [ ] Hypercoagulable state  [ ] Thrombocytopenia  ----------------------------------------------------------------------  [ ] Cerebral infarction  [ ] Transient ischemia attack  [ ] Encephalopathy - Toxic or Metabolic                                80F with PMH of HTN, PAD (s/p LLE BK Pop to DP bypass with rGSV on 5/7/20) admitted for a LLE surgical incision draining infection.    - Home medications - Holding ARB/Thiazide  - IV Abx - Vanc/Zosyn, f/u wound cultures  - Diet  - AM labs  - OR today

## 2020-07-11 NOTE — PROGRESS NOTE ADULT - SUBJECTIVE AND OBJECTIVE BOX
· Subjective and Objective: 	  Patient is a 80y old  Female who presents with a chief complaint of L surgical incision infection (10 Jul 2020 05:10)      HPI:  80F with PMH of HTN, PAD (s/p LLE BK Pop to DP bypass with rGSV on 5/7/20) presents from outpatient office due to concern for an infected LLE incision. Patient states her recovery had been fairly unremarkable with a return to normal function, as well as improvement in her LLE heel wound. About 1 week ago she and her  noticed green discharge coming out of her medial upper thigh incision that had a foul odor. She was seen by her PCP who prescribed her a course of Doxycycline. She was then seen in the vascular outpatient office and it was recommended that she come to the ED for admission for IV Abx and surgical I+D. She denies fevers/chills, CP/SOB, N/V, changes in bowel or urinary habits.  Pt now s/p debridement    Subjective:  Pt feels well today, no new complaints. Resting comfortably in bed.     Allergies    No Known Allergies    Intolerances    Home meds:      MEDICATIONS  (STANDING):  aspirin enteric coated 81 milliGRAM(s) Oral daily  atorvastatin 40 milliGRAM(s) Oral at bedtime  clopidogrel Tablet 75 milliGRAM(s) Oral daily  heparin   Injectable 5000 Unit(s) SubCutaneous every 8 hours  piperacillin/tazobactam IVPB.. 3.375 Gram(s) IV Intermittent every 6 hours  polyethylene glycol 3350 17 Gram(s) Oral daily  senna 2 Tablet(s) Oral at bedtime  vancomycin  IVPB 1000 milliGRAM(s) IV Intermittent every 24 hours    MEDICATIONS  (PRN):  acetaminophen   Tablet .. 650 milliGRAM(s) Oral every 6 hours PRN Mild Pain (1 - 3), Moderate Pain (4 - 6)      Drug Dosing Weight  Height (cm): 165 (10 Jul 2020 10:26)  Weight (kg): 76 (10 Jul 2020 10:26)  BMI (kg/m2): 27.9 (10 Jul 2020 10:26)  BSA (m2): 1.83 (10 Jul 2020 10:26)    PAST MEDICAL & SURGICAL HISTORY:  Thyroid disease  HTN (hypertension)  PAD (peripheral artery disease): ischemic ulcer  Ankle fracture      FAMILY HISTORY:  no cardiac disease    SOCIAL HISTORY:  never smoker    Vital Signs Last 24 Hrs  T(C): 36.9 (11 Jul 2020 17:15), Max: 36.9 (11 Jul 2020 17:15)  T(F): 98.4 (11 Jul 2020 17:15), Max: 98.4 (11 Jul 2020 17:15)  HR: 66 (11 Jul 2020 21:26) (66 - 80)  BP: 124/98 (11 Jul 2020 21:26) (104/55 - 133/61)  BP(mean): 74 (11 Jul 2020 15:51) (74 - 90)  RR: 20 (11 Jul 2020 21:26) (16 - 20)  SpO2: 96% (11 Jul 2020 21:26) (96% - 99%)          PHYSICAL EXAM:    Constitutional: NAD  ENMT: MMM  Neck: supple  Back: midline  Respiratory: CTA b/l  Cardiovascular: rrr, s1s2, no m/r/g  Gastrointestinal: soft, NTND, + BS  Extremities: L thigh wound vac in place, LLE edema  Vascular: + 2 pulses radial  Neurological: AAO x 4  Skin: no rashes noted   Lymph Nodes: no LAD  Musculoskeletal: no joint swelling  Psychiatric: normal affect        LABS:                        10.0   6.79  )-----------( 245      ( 11 Jul 2020 09:19 )             31.7   07-11    146<H>  |  110<H>  |  17  ----------------------------<  93  4.1   |  23  |  1.01    Ca    9.2      11 Jul 2020 09:19  Phos  3.1     07-11  Mg     2.1     07-11    TPro  6.1  /  Alb  3.3  /  TBili  0.4  /  DBili  x   /  AST  13  /  ALT  9<L>  /  AlkPhos  68  07-11        EKG:  NSR    ECHO, US:  TTE 5/2020:   1. Normal left and right ventricular size and systolic function.   2. Grade II left ventricular diastolic dysfunction with elevated filling pressure.   3. Mild symmetric left ventricular hypertrophy.   4. Aortic sclerosis without significant stenosis.   5. Pulmonary hypertension present, pulmonary artery systolic pressure is 37 mmHg.   6. No pericardial effusion.    RADIOLOGY:  CT:  2.4 x 5.8 x 12.1 cm low-density collection in the left medial thigh could represent postoperative hematoma, seroma, or abscess. In the setting of infection abscess should be strongly considered. No medication with vascular system is identified on this noncontrast examination.

## 2020-07-11 NOTE — PROGRESS NOTE ADULT - ASSESSMENT
80F with PMH of HTN, PAD (s/p LLE BK Pop to DP bypass with rGSV on 5/7/20) presents from outpatient office due to concern for an infected LLE incision now s/p debriedment     Problem/Recommendation - 1:  Problem: Leg pain, anterior, left. Recommendation: Management as per primary team  -Pt now s/p debridement  -Cultures still in process  -Vanc/Zosyn, creatinine stable      Problem/Recommendation - 2:  ·  Problem: PAD (peripheral artery disease).  Recommendation: ASA, Plavix.      Problem/Recommendation - 3:  ·  Problem: HTN (hypertension).  Recommendation: Normotensive  -Hold home antihypertensive.      Problem/Recommendation - 4:  ·  Problem: Fever.  Recommendation: In setting of infection  -Trend fever curve  -Tylenol.      Problem/Recommendation - 5:  ·  Problem: CKD (chronic kidney disease) stage 3, GFR 30-59 ml/min.  Recommendation: Close to stable disease.      Problem/Recommendation - 6:  Problem: Anemia, normocytic normochromic. Recommendation: At baseline.

## 2020-07-12 LAB
-  AMPICILLIN/SULBACTAM: SIGNIFICANT CHANGE UP
-  AMPICILLIN/SULBACTAM: SIGNIFICANT CHANGE UP
-  AMPICILLIN: SIGNIFICANT CHANGE UP
-  AMPICILLIN: SIGNIFICANT CHANGE UP
-  CEFAZOLIN: SIGNIFICANT CHANGE UP
-  CEFAZOLIN: SIGNIFICANT CHANGE UP
-  CEFEPIME: SIGNIFICANT CHANGE UP
-  CEFEPIME: SIGNIFICANT CHANGE UP
-  CEFTRIAXONE: SIGNIFICANT CHANGE UP
-  CEFTRIAXONE: SIGNIFICANT CHANGE UP
-  CIPROFLOXACIN: SIGNIFICANT CHANGE UP
-  CIPROFLOXACIN: SIGNIFICANT CHANGE UP
-  GENTAMICIN: SIGNIFICANT CHANGE UP
-  GENTAMICIN: SIGNIFICANT CHANGE UP
-  PIPERACILLIN/TAZOBACTAM: SIGNIFICANT CHANGE UP
-  PIPERACILLIN/TAZOBACTAM: SIGNIFICANT CHANGE UP
-  TOBRAMYCIN: SIGNIFICANT CHANGE UP
-  TOBRAMYCIN: SIGNIFICANT CHANGE UP
-  TRIMETHOPRIM/SULFAMETHOXAZOLE: SIGNIFICANT CHANGE UP
-  TRIMETHOPRIM/SULFAMETHOXAZOLE: SIGNIFICANT CHANGE UP
ANION GAP SERPL CALC-SCNC: 11 MMOL/L — SIGNIFICANT CHANGE UP (ref 5–17)
BUN SERPL-MCNC: 13 MG/DL — SIGNIFICANT CHANGE UP (ref 7–23)
CALCIUM SERPL-MCNC: 9.7 MG/DL — SIGNIFICANT CHANGE UP (ref 8.4–10.5)
CHLORIDE SERPL-SCNC: 110 MMOL/L — HIGH (ref 96–108)
CO2 SERPL-SCNC: 24 MMOL/L — SIGNIFICANT CHANGE UP (ref 22–31)
CREAT SERPL-MCNC: 1.06 MG/DL — SIGNIFICANT CHANGE UP (ref 0.5–1.3)
CULTURE RESULTS: SIGNIFICANT CHANGE UP
GLUCOSE SERPL-MCNC: 113 MG/DL — HIGH (ref 70–99)
HCT VFR BLD CALC: 32.3 % — LOW (ref 34.5–45)
HGB BLD-MCNC: 10 G/DL — LOW (ref 11.5–15.5)
MAGNESIUM SERPL-MCNC: 2.2 MG/DL — SIGNIFICANT CHANGE UP (ref 1.6–2.6)
MCHC RBC-ENTMCNC: 29.2 PG — SIGNIFICANT CHANGE UP (ref 27–34)
MCHC RBC-ENTMCNC: 31 GM/DL — LOW (ref 32–36)
MCV RBC AUTO: 94.4 FL — SIGNIFICANT CHANGE UP (ref 80–100)
METHOD TYPE: SIGNIFICANT CHANGE UP
METHOD TYPE: SIGNIFICANT CHANGE UP
NRBC # BLD: 0 /100 WBCS — SIGNIFICANT CHANGE UP (ref 0–0)
ORGANISM # SPEC MICROSCOPIC CNT: SIGNIFICANT CHANGE UP
ORGANISM # SPEC MICROSCOPIC CNT: SIGNIFICANT CHANGE UP
PHOSPHATE SERPL-MCNC: 2.8 MG/DL — SIGNIFICANT CHANGE UP (ref 2.5–4.5)
PLATELET # BLD AUTO: 252 K/UL — SIGNIFICANT CHANGE UP (ref 150–400)
POTASSIUM SERPL-MCNC: 4.3 MMOL/L — SIGNIFICANT CHANGE UP (ref 3.5–5.3)
POTASSIUM SERPL-SCNC: 4.3 MMOL/L — SIGNIFICANT CHANGE UP (ref 3.5–5.3)
RBC # BLD: 3.42 M/UL — LOW (ref 3.8–5.2)
RBC # FLD: 12.7 % — SIGNIFICANT CHANGE UP (ref 10.3–14.5)
SODIUM SERPL-SCNC: 145 MMOL/L — SIGNIFICANT CHANGE UP (ref 135–145)
SPECIMEN SOURCE: SIGNIFICANT CHANGE UP
WBC # BLD: 5.8 K/UL — SIGNIFICANT CHANGE UP (ref 3.8–10.5)
WBC # FLD AUTO: 5.8 K/UL — SIGNIFICANT CHANGE UP (ref 3.8–10.5)

## 2020-07-12 PROCEDURE — 99223 1ST HOSP IP/OBS HIGH 75: CPT

## 2020-07-12 RX ORDER — HYDROCHLOROTHIAZIDE 25 MG
12.5 TABLET ORAL DAILY
Refills: 0 | Status: DISCONTINUED | OUTPATIENT
Start: 2020-07-12 | End: 2020-07-14

## 2020-07-12 RX ORDER — LOSARTAN POTASSIUM 100 MG/1
100 TABLET, FILM COATED ORAL DAILY
Refills: 0 | Status: DISCONTINUED | OUTPATIENT
Start: 2020-07-12 | End: 2020-07-14

## 2020-07-12 RX ADMIN — Medication 1 TABLET(S): at 18:07

## 2020-07-12 NOTE — PROGRESS NOTE ADULT - SUBJECTIVE AND OBJECTIVE BOX
· Subjective and Objective: 	  Patient is a 80y old  Female who presents with a chief complaint of L surgical incision infection (10 Jul 2020 05:10)      HPI:  80F with PMH of HTN, PAD (s/p LLE BK Pop to DP bypass with rGSV on 5/7/20) presents from outpatient office due to concern for an infected LLE incision. Patient states her recovery had been fairly unremarkable with a return to normal function, as well as improvement in her LLE heel wound. About 1 week ago she and her  noticed green discharge coming out of her medial upper thigh incision that had a foul odor. She was seen by her PCP who prescribed her a course of Doxycycline. She was then seen in the vascular outpatient office and it was recommended that she come to the ED for admission for IV Abx and surgical I+D. She denies fevers/chills, CP/SOB, N/V, changes in bowel or urinary habits.  Pt now s/p debridement    Subjective:  Pt feels well today, no new complaints. Resting comfortably in bed.     Allergies    No Known Allergies    Intolerances    Home meds:      MEDICATIONS  (STANDING):  aspirin enteric coated 81 milliGRAM(s) Oral daily  atorvastatin 40 milliGRAM(s) Oral at bedtime  clopidogrel Tablet 75 milliGRAM(s) Oral daily  heparin   Injectable 5000 Unit(s) SubCutaneous every 8 hours  hydrochlorothiazide 12.5 milliGRAM(s) Oral daily  losartan 100 milliGRAM(s) Oral daily  polyethylene glycol 3350 17 Gram(s) Oral daily  senna 2 Tablet(s) Oral at bedtime  trimethoprim  160 mG/sulfamethoxazole 800 mG 1 Tablet(s) Oral two times a day    MEDICATIONS  (PRN):  acetaminophen   Tablet .. 650 milliGRAM(s) Oral every 6 hours PRN Mild Pain (1 - 3), Moderate Pain (4 - 6)        Drug Dosing Weight  Height (cm): 165 (10 Jul 2020 10:26)  Weight (kg): 76 (10 Jul 2020 10:26)  BMI (kg/m2): 27.9 (10 Jul 2020 10:26)  BSA (m2): 1.83 (10 Jul 2020 10:26)    PAST MEDICAL & SURGICAL HISTORY:  Thyroid disease  HTN (hypertension)  PAD (peripheral artery disease): ischemic ulcer  Ankle fracture      FAMILY HISTORY:  no cardiac disease    SOCIAL HISTORY:  never smoker    Vital Signs Last 24 Hrs  T(C): 36.6 (12 Jul 2020 22:00), Max: 36.8 (12 Jul 2020 05:19)  T(F): 97.9 (12 Jul 2020 22:00), Max: 98.3 (12 Jul 2020 18:20)  HR: 68 (12 Jul 2020 20:32) (68 - 76)  BP: 154/73 (12 Jul 2020 20:32) (122/64 - 154/73)  BP(mean): 105 (12 Jul 2020 20:32) (87 - 105)  RR: 18 (12 Jul 2020 20:32) (16 - 18)  SpO2: 98% (12 Jul 2020 20:32) (96% - 99%)      PHYSICAL EXAM:    Constitutional: NAD  ENMT: MMM  Neck: supple  Back: midline  Respiratory: CTA b/l  Cardiovascular: rrr, s1s2, no m/r/g  Gastrointestinal: soft, NTND, + BS  Extremities: L thigh wound vac in place, LLE edema  Vascular: + 2 pulses radial  Neurological: AAO x 4  Skin: no rashes noted   Lymph Nodes: no LAD  Musculoskeletal: no joint swelling  Psychiatric: normal affect        LABS:                        10.0   5.80  )-----------( 252      ( 12 Jul 2020 08:12 )             32.3   07-12    145  |  110<H>  |  13  ----------------------------<  113<H>  4.3   |  24  |  1.06    Ca    9.7      12 Jul 2020 08:12  Phos  2.8     07-12  Mg     2.2     07-12    TPro  6.1  /  Alb  3.3  /  TBili  0.4  /  DBili  x   /  AST  13  /  ALT  9<L>  /  AlkPhos  68  07-11          EKG:  NSR    ECHO, US:  TTE 5/2020:   1. Normal left and right ventricular size and systolic function.   2. Grade II left ventricular diastolic dysfunction with elevated filling pressure.   3. Mild symmetric left ventricular hypertrophy.   4. Aortic sclerosis without significant stenosis.   5. Pulmonary hypertension present, pulmonary artery systolic pressure is 37 mmHg.   6. No pericardial effusion.    RADIOLOGY:  CT:  2.4 x 5.8 x 12.1 cm low-density collection in the left medial thigh could represent postoperative hematoma, seroma, or abscess. In the setting of infection abscess should be strongly considered. No medication with vascular system is identified on this noncontrast examination.

## 2020-07-12 NOTE — PROGRESS NOTE ADULT - SUBJECTIVE AND OBJECTIVE BOX
7/11: Afebrile. Dressing changed in L foot. OOBA.                                   80F with PMH of HTN, PAD (s/p LLE BK Pop to DP bypass with rGSV on 5/7/20) admitted for a LLE surgical incision draining infection.    - Admit to Vascular Surgery, Telemetry  - Home medications - Holding ARB/Thiazide  - IV Abx - Vanc/Zosyn, f/u wound cultures  - COVID Swab STAT - Pre-op for OR  - DASH Diet, NPO after midnight  - AM labs  - f/u CT LLE Non-con  - Pre-op for OR tomorrow for incision and drainage 7/11: Afebrile. Dressing changed in L foot. OOBA.           piperacillin/tazobactam IVPB.. 3.375  aspirin enteric coated 81  clopidogrel Tablet 75  heparin   Injectable 5000  piperacillin/tazobactam IVPB.. 3.375      Allergies    No Known Allergies    Intolerances        Vital Signs Last 24 Hrs  T(C): 36.5 (12 Jul 2020 09:08), Max: 37 (11 Jul 2020 21:26)  T(F): 97.7 (12 Jul 2020 09:08), Max: 98.6 (11 Jul 2020 21:26)  HR: 68 (12 Jul 2020 08:10) (66 - 70)  BP: 146/63 (12 Jul 2020 08:10) (104/55 - 146/63)  BP(mean): 91 (12 Jul 2020 08:10) (74 - 91)  RR: 17 (12 Jul 2020 08:10) (17 - 20)  SpO2: 99% (12 Jul 2020 08:10) (96% - 99%)  I&O's Summary    11 Jul 2020 07:01  -  12 Jul 2020 07:00  --------------------------------------------------------  IN: 350 mL / OUT: 1580 mL / NET: -1230 mL      Physical Exam:  General: NAD  Pulmonary: Nonlabored breathing, no respiratory distress  Cardiovascular: NSR  Abdominal: soft, NT/ND  Extremities: left thigh with wound VAC in place with good seal, no surrounding erythema/edema       LABS:                        10.0   5.80  )-----------( 252      ( 12 Jul 2020 08:12 )             32.3     07-12    145  |  110<H>  |  13  ----------------------------<  113<H>  4.3   |  24  |  1.06    Ca    9.7      12 Jul 2020 08:12  Phos  2.8     07-12  Mg     2.2     07-12    TPro  6.1  /  Alb  3.3  /  TBili  0.4  /  DBili  x   /  AST  13  /  ALT  9<L>  /  AlkPhos  68  07-11          ---------------------------------------------------------------------------  PLEASE CHECK WHEN PRESENT:     [  ]Heart Failure     [  ] Acute     [  ] Acute on Chronic     [  ] Chronic  -------------------------------------------------------------------     [  ]Diastolic [HFpEF]     [  ]Systolic [HFrEF]     [  ]Combined [HFpEF & HFrEF]  .................................................................................     [  ]Other:     [ ] Pulmonary Hypertension     [ ] Afib     [ x] Hypertensive Heart Disease  -------------------------------------------------------------------  [ ] Respiratory failure  [ ] Acute cor pulmonale  [ ] Asthma/COPD Exacerbation  [ ] Pleural effusion  [ ] Aspiration pneumonia  [ ] Obstructive Sleep Apnea  -------------------------------------------------------------------  [  ]MANOJ     [  ]ATN     [  ]Reneal Medullary Necrosis     [  ]Renal Cortical Necrosis     [  ]Other Pathological Lesions:    [  ]CKD 1  [  ]CKD 2  [  ]CKD 3  [  ]CKD 4  [  ]CKD 5  [  ]Other  -------------------------------------------------------------------  [  ]Diabetes  [  ] Diabetic PVD Ulcer  [  ] Neuropathic ulcer to DM  [  ] Diabetes with Nephropathy  [  ] Osteomyelitis due to diabetes  --------------------------------------------------------------------  [  ]Malnutrition: See Nutrition Note  [  ]Cachexia  [  ]Other:   [  ]Supplement Ordered:  [  ]Morbid Obesity (BMI >=40]  ---------------------------------------------------------------------  [ ] Sepsis/severe sepsis/septic shock  [ ] Noninfectious SIRS  [ ] UTI  [ ] Pneumonia  -----------------------------------------------------------------------  [ ] Acidosis/alkalosis  [ ] Fluid overload  [ ] Hypokalemia  [ ] Hyperkalemia  [ ] Hypomagnesemia  [ ] Hypophosphatemia  [ ] Hyperphosphatemia  ------------------------------------------------------------------------  [ ] Acute blood loss anemia  [ ] Post op blood loss anemia  [ ] Iron deficiency anemia  [ ] Anemia due to chronic disease  [ ] Hypercoagulable state  [ ] Thrombocytopenia  ----------------------------------------------------------------------  [ ] Cerebral infarction  [ ] Transient ischemia attack  [ ] Encephalopathy - Toxic or Metabolic              80F with PMH of HTN, PAD (s/p LLE BK Pop to DP bypass with rGSV on 5/7/20) admitted for a left thigh abscess, s/p left thigh I&D with washout and wound VAC placement on 7/10/20    - Home medications - Holding ARB/Thiazide (Benicar)  - IV Abx -Zosyn for morganella, off vancomycin  - COVID negative  - DASH Diet  - AM labs  - Wound VAC to left thigh M-W-F change

## 2020-07-12 NOTE — PROGRESS NOTE ADULT - ASSESSMENT
80F with PMH of HTN, PAD (s/p LLE BK Pop to DP bypass with rGSV on 5/7/20) presents from outpatient office due to concern for an infected LLE incision now s/p debriedment     Problem/Recommendation - 1:  Problem: Leg pain, anterior, left. Recommendation: Management as per primary team  -Pt now s/p debridement  -culture sensitive to bactrim, recommend transitioning to po bactrim total 10 day course     Problem/Recommendation - 2:  ·  Problem: PAD (peripheral artery disease).  Recommendation: ASA, Plavix.      Problem/Recommendation - 3:  ·  Problem: HTN (hypertension).  Recommendation: Normotensive  -Hold home antihypertensive.      Problem/Recommendation - 4:  ·  Problem: Fever.  Recommendation: In setting of infection  -Trend fever curve  -Tylenol.      Problem/Recommendation - 5:  ·  Problem: CKD (chronic kidney disease) stage 3, GFR 30-59 ml/min.  Recommendation: Close to stable disease.      Problem/Recommendation - 6:  Problem: Anemia, normocytic normochromic. Recommendation: At baseline.

## 2020-07-13 LAB
-  AMOXICILLIN/CLAVULANIC ACID: SIGNIFICANT CHANGE UP
-  ERTAPENEM: SIGNIFICANT CHANGE UP
-  MEROPENEM: SIGNIFICANT CHANGE UP
-  PIPERACILLIN/TAZOBACTAM: SIGNIFICANT CHANGE UP
ANION GAP SERPL CALC-SCNC: 10 MMOL/L — SIGNIFICANT CHANGE UP (ref 5–17)
BUN SERPL-MCNC: 13 MG/DL — SIGNIFICANT CHANGE UP (ref 7–23)
CALCIUM SERPL-MCNC: 9.4 MG/DL — SIGNIFICANT CHANGE UP (ref 8.4–10.5)
CHLORIDE SERPL-SCNC: 111 MMOL/L — HIGH (ref 96–108)
CO2 SERPL-SCNC: 23 MMOL/L — SIGNIFICANT CHANGE UP (ref 22–31)
CREAT SERPL-MCNC: 0.92 MG/DL — SIGNIFICANT CHANGE UP (ref 0.5–1.3)
CULTURE RESULTS: SIGNIFICANT CHANGE UP
GLUCOSE SERPL-MCNC: 102 MG/DL — HIGH (ref 70–99)
HCT VFR BLD CALC: 31.6 % — LOW (ref 34.5–45)
HGB BLD-MCNC: 9.9 G/DL — LOW (ref 11.5–15.5)
MAGNESIUM SERPL-MCNC: 2 MG/DL — SIGNIFICANT CHANGE UP (ref 1.6–2.6)
MCHC RBC-ENTMCNC: 29.5 PG — SIGNIFICANT CHANGE UP (ref 27–34)
MCHC RBC-ENTMCNC: 31.3 GM/DL — LOW (ref 32–36)
MCV RBC AUTO: 94 FL — SIGNIFICANT CHANGE UP (ref 80–100)
METHOD TYPE: SIGNIFICANT CHANGE UP
NRBC # BLD: 0 /100 WBCS — SIGNIFICANT CHANGE UP (ref 0–0)
ORGANISM # SPEC MICROSCOPIC CNT: SIGNIFICANT CHANGE UP
PHOSPHATE SERPL-MCNC: 2.6 MG/DL — SIGNIFICANT CHANGE UP (ref 2.5–4.5)
PLATELET # BLD AUTO: 242 K/UL — SIGNIFICANT CHANGE UP (ref 150–400)
POTASSIUM SERPL-MCNC: 4.2 MMOL/L — SIGNIFICANT CHANGE UP (ref 3.5–5.3)
POTASSIUM SERPL-SCNC: 4.2 MMOL/L — SIGNIFICANT CHANGE UP (ref 3.5–5.3)
RBC # BLD: 3.36 M/UL — LOW (ref 3.8–5.2)
RBC # FLD: 12.5 % — SIGNIFICANT CHANGE UP (ref 10.3–14.5)
SODIUM SERPL-SCNC: 144 MMOL/L — SIGNIFICANT CHANGE UP (ref 135–145)
SPECIMEN SOURCE: SIGNIFICANT CHANGE UP
WBC # BLD: 7.19 K/UL — SIGNIFICANT CHANGE UP (ref 3.8–10.5)
WBC # FLD AUTO: 7.19 K/UL — SIGNIFICANT CHANGE UP (ref 3.8–10.5)

## 2020-07-13 PROCEDURE — 99233 SBSQ HOSP IP/OBS HIGH 50: CPT | Mod: GC

## 2020-07-13 RX ORDER — PHENYLEPHRINE-SHARK LIVER OIL-MINERAL OIL-PETROLATUM RECTAL OINTMENT
1 OINTMENT (GRAM) RECTAL ONCE
Refills: 0 | Status: COMPLETED | OUTPATIENT
Start: 2020-07-13 | End: 2020-07-13

## 2020-07-13 RX ORDER — LANOLIN ALCOHOL/MO/W.PET/CERES
1 CREAM (GRAM) TOPICAL ONCE
Refills: 0 | Status: COMPLETED | OUTPATIENT
Start: 2020-07-13 | End: 2020-07-13

## 2020-07-13 RX ORDER — METRONIDAZOLE 500 MG
500 TABLET ORAL EVERY 8 HOURS
Refills: 0 | Status: DISCONTINUED | OUTPATIENT
Start: 2020-07-13 | End: 2020-07-14

## 2020-07-13 RX ADMIN — Medication 500 MILLIGRAM(S): at 21:05

## 2020-07-13 RX ADMIN — LOSARTAN POTASSIUM 100 MILLIGRAM(S): 100 TABLET, FILM COATED ORAL at 06:11

## 2020-07-13 RX ADMIN — Medication 12.5 MILLIGRAM(S): at 06:11

## 2020-07-13 RX ADMIN — Medication 1 TABLET(S): at 06:11

## 2020-07-13 RX ADMIN — Medication 1 TABLET(S): at 17:03

## 2020-07-13 RX ADMIN — PHENYLEPHRINE-SHARK LIVER OIL-MINERAL OIL-PETROLATUM RECTAL OINTMENT 1 APPLICATION(S): at 15:34

## 2020-07-13 NOTE — PROGRESS NOTE ADULT - SUBJECTIVE AND OBJECTIVE BOX
Subjective:  Pt feels well today, no new complaints. Resting comfortably in bed.     O/N events: none    Allergies    No Known Allergies    Intolerances    Home meds:      MEDICATIONS  (STANDING):  aspirin enteric coated 81 milliGRAM(s) Oral daily  atorvastatin 40 milliGRAM(s) Oral at bedtime  clopidogrel Tablet 75 milliGRAM(s) Oral daily  heparin   Injectable 5000 Unit(s) SubCutaneous every 8 hours  hydrochlorothiazide 12.5 milliGRAM(s) Oral daily  losartan 100 milliGRAM(s) Oral daily  melatonin 1 milliGRAM(s) Oral once  metroNIDAZOLE    Tablet 500 milliGRAM(s) Oral every 8 hours  polyethylene glycol 3350 17 Gram(s) Oral daily  senna 2 Tablet(s) Oral at bedtime  trimethoprim  160 mG/sulfamethoxazole 800 mG 1 Tablet(s) Oral two times a day    MEDICATIONS  (PRN):  acetaminophen   Tablet .. 650 milliGRAM(s) Oral every 6 hours PRN Mild Pain (1 - 3), Moderate Pain (4 - 6)        Drug Dosing Weight  Height (cm): 165 (10 Jul 2020 10:26)  Weight (kg): 76 (10 Jul 2020 10:26)  BMI (kg/m2): 27.9 (10 Jul 2020 10:26)  BSA (m2): 1.83 (10 Jul 2020 10:26)    PAST MEDICAL & SURGICAL HISTORY:  Thyroid disease  HTN (hypertension)  PAD (peripheral artery disease): ischemic ulcer  Ankle fracture      FAMILY HISTORY:  no cardiac disease    SOCIAL HISTORY:  never smoker    Vital Signs Last 24 Hrs  T(C): 36.8 (13 Jul 2020 14:00), Max: 36.8 (12 Jul 2020 18:20)  T(F): 98.3 (13 Jul 2020 14:00), Max: 98.3 (12 Jul 2020 18:20)  HR: 88 (13 Jul 2020 16:46) (68 - 96)  BP: 130/64 (13 Jul 2020 16:46) (118/71 - 161/69)  BP(mean): 91 (13 Jul 2020 16:46) (11 - 105)  RR: 15 (13 Jul 2020 16:46) (14 - 18)  SpO2: 94% (13 Jul 2020 16:46) (94% - 100%)      PHYSICAL EXAM:    Constitutional: NAD  ENMT: MMM  Neck: supple  Back: midline  Respiratory: CTA b/l  Cardiovascular: rrr, s1s2, no m/r/g  Gastrointestinal: soft, NTND, + BS  Extremities: L thigh wound vac in place, LLE edema  Vascular: + 2 pulses radial  Neurological: AAO x 4  Skin: no rashes noted   Lymph Nodes: no LAD  Musculoskeletal: no joint swelling  Psychiatric: normal affect        LABS:                                   9.9    7.19  )-----------( 242      ( 13 Jul 2020 05:56 )             31.6   07-13    144  |  111<H>  |  13  ----------------------------<  102<H>  4.2   |  23  |  0.92    Ca    9.4      13 Jul 2020 05:56  Phos  2.6     07-13  Mg     2.0     07-13              EKG:  NSR    ECHO, US:  TTE 5/2020:   1. Normal left and right ventricular size and systolic function.   2. Grade II left ventricular diastolic dysfunction with elevated filling pressure.   3. Mild symmetric left ventricular hypertrophy.   4. Aortic sclerosis without significant stenosis.   5. Pulmonary hypertension present, pulmonary artery systolic pressure is 37 mmHg.   6. No pericardial effusion.    RADIOLOGY:  CT:  2.4 x 5.8 x 12.1 cm low-density collection in the left medial thigh could represent postoperative hematoma, seroma, or abscess. In the setting of infection abscess should be strongly considered. No medication with vascular system is identified on this noncontrast examination.

## 2020-07-13 NOTE — PROGRESS NOTE ADULT - SUBJECTIVE AND OBJECTIVE BOX
24hr Event:  O/N: SONIA, VSS  7/12: wound cx - morganella (s) to zosyn, d/c'ed vanco, 's, restarted home benicar, plan for home VAC - forms filled out on chart          Assessment/Plan:  80F with PMH of HTN, PAD (s/p LLE BK Pop to DP bypass with rGSV on 5/7/20) admitted for a left thigh abscess, s/p left thigh I&D with washout and wound VAC placement on 7/10/20    - Home medications - Holding ARB/Thiazide (Benicar)  - IV Abx -Zosyn for morganella, off vancomycin  - COVID negative  - DASH Diet  - AM labs  - Wound VAC to left thigh M-W-F change 24hr Event:  O/N: FAINA SCOTT  7/12: wound cx - morganella (s) to zosyn, d/c'ed vanco, 's, restarted home benicar, plan for home VAC - forms filled out on chart    trimethoprim  160 mG/sulfamethoxazole 800 mG 1  aspirin enteric coated 81  clopidogrel Tablet 75  heparin   Injectable 5000  hydrochlorothiazide 12.5  losartan 100  trimethoprim  160 mG/sulfamethoxazole 800 mG 1        Vital Signs Last 24 Hrs  T(C): 36.7 (13 Jul 2020 04:54), Max: 36.8 (12 Jul 2020 14:15)  T(F): 98.1 (13 Jul 2020 04:54), Max: 98.3 (12 Jul 2020 18:20)  HR: 80 (13 Jul 2020 04:33) (68 - 80)  BP: 154/70 (13 Jul 2020 04:33) (128/62 - 154/73)  BP(mean): 100 (13 Jul 2020 04:33) (11 - 105)  RR: 18 (13 Jul 2020 04:33) (16 - 18)  SpO2: 96% (13 Jul 2020 04:33) (95% - 99%)  I&O's Summary    12 Jul 2020 07:01  -  13 Jul 2020 07:00  --------------------------------------------------------  IN: 0 mL / OUT: 1130 mL / NET: -1130 mL        Physical Exam:  General: NAD, resting comfortably in bed  Pulmonary: Nonlabored breathing, no respiratory distress  Abdominal: soft, NT/ND  Extremities: left thigh with wound VAC in place with good seal, no surrounding erythema/edema       LABS:                        9.9    7.19  )-----------( 242      ( 13 Jul 2020 05:56 )             31.6     07-13    144  |  111<H>  |  13  ----------------------------<  102<H>  4.2   |  23  |  0.92    Ca    9.4      13 Jul 2020 05:56  Phos  2.6     07-13  Mg     2.0     07-13    TPro  6.1  /  Alb  3.3  /  TBili  0.4  /  DBili  x   /  AST  13  /  ALT  9<L>  /  AlkPhos  68  07-11    PLEASE CHECK WHEN PRESENT:     [  ]Heart Failure     [  ] Acute     [  ] Acute on Chronic     [  ] Chronic  -------------------------------------------------------------------     [  ]Diastolic [HFpEF]     [  ]Systolic [HFrEF]     [  ]Combined [HFpEF & HFrEF]  .................................................................................     [  ]Other:     [ ] Pulmonary Hypertension     [ ] Afib     [ x] Hypertensive Heart Disease  -------------------------------------------------------------------  [ ] Respiratory failure  [ ] Acute cor pulmonale  [ ] Asthma/COPD Exacerbation  [ ] Pleural effusion  [ ] Aspiration pneumonia  [ ] Obstructive Sleep Apnea  -------------------------------------------------------------------  [  ]MANOJ     [  ]ATN     [  ]Reneal Medullary Necrosis     [  ]Renal Cortical Necrosis     [  ]Other Pathological Lesions:    [  ]CKD 1  [  ]CKD 2  [  ]CKD 3  [  ]CKD 4  [  ]CKD 5  [  ]Other  -------------------------------------------------------------------  [  ]Diabetes  [  ] Diabetic PVD Ulcer  [  ] Neuropathic ulcer to DM  [  ] Diabetes with Nephropathy  [  ] Osteomyelitis due to diabetes  --------------------------------------------------------------------  [  ]Malnutrition: See Nutrition Note  [  ]Cachexia  [  ]Other:   [  ]Supplement Ordered:  [  ]Morbid Obesity (BMI >=40]  ---------------------------------------------------------------------  [ ] Sepsis/severe sepsis/septic shock  [ ] Noninfectious SIRS  [ ] UTI  [ ] Pneumonia  -----------------------------------------------------------------------  [ ] Acidosis/alkalosis  [ ] Fluid overload  [ ] Hypokalemia  [ ] Hyperkalemia  [ ] Hypomagnesemia  [ ] Hypophosphatemia  [ ] Hyperphosphatemia  ------------------------------------------------------------------------  [ ] Acute blood loss anemia  [ ] Post op blood loss anemia  [ ] Iron deficiency anemia  [ ] Anemia due to chronic disease  [ ] Hypercoagulable state  [ ] Thrombocytopenia  ----------------------------------------------------------------------  [ ] Cerebral infarction  [ ] Transient ischemia attack  [ ] Encephalopathy - Toxic or Metabolic      Assessment/Plan:  80F with PMH of HTN, PAD (s/p LLE BK Pop to DP bypass with rGSV on 5/7/20) admitted for a left thigh abscess, s/p left thigh I&D with washout and wound VAC placement on 7/10/20    - Home medications - Holding ARB/Thiazide (Benicar)  - IV Abx -off Zosyn for morganella, off vancomycin, started PO bactrim  - COVID negative  - DASH Diet  - AM labs  - Wound VAC to left thigh M-W-F change

## 2020-07-13 NOTE — PHYSICAL THERAPY INITIAL EVALUATION ADULT - GENERAL OBSERVATIONS, REHAB EVAL
Patient received supine (+) Left anterior thigh wound vac (+) EKG (+) heplock tolerating hallway mobility and stair neg, cleared from inpatient PT

## 2020-07-13 NOTE — PHYSICAL THERAPY INITIAL EVALUATION ADULT - ADDITIONAL COMMENTS
Patient lives with  with 4+5 steps upon entry to home denies falls or use of DME, has sc and RW at home, patient stated she walks multiple blocks with  and they are bike riders.

## 2020-07-13 NOTE — PHYSICAL THERAPY INITIAL EVALUATION ADULT - PERTINENT HX OF CURRENT PROBLEM, REHAB EVAL
80F with PMH of HTN, PAD (s/p LLE BK Pop to DP bypass with rGSV on 5/7/20) admitted for a left thigh abscess, s/p left thigh I&D with washout and wound VAC placement on 7/10/20

## 2020-07-14 ENCOUNTER — TRANSCRIPTION ENCOUNTER (OUTPATIENT)
Age: 80
End: 2020-07-14

## 2020-07-14 VITALS
DIASTOLIC BLOOD PRESSURE: 66 MMHG | SYSTOLIC BLOOD PRESSURE: 141 MMHG | HEART RATE: 74 BPM | RESPIRATION RATE: 18 BRPM | OXYGEN SATURATION: 96 %

## 2020-07-14 LAB
CULTURE RESULTS: SIGNIFICANT CHANGE UP
CULTURE RESULTS: SIGNIFICANT CHANGE UP
SPECIMEN SOURCE: SIGNIFICANT CHANGE UP
SPECIMEN SOURCE: SIGNIFICANT CHANGE UP
SURGICAL PATHOLOGY STUDY: SIGNIFICANT CHANGE UP

## 2020-07-14 PROCEDURE — 99233 SBSQ HOSP IP/OBS HIGH 50: CPT

## 2020-07-14 RX ORDER — METRONIDAZOLE 500 MG
1 TABLET ORAL
Qty: 21 | Refills: 0
Start: 2020-07-14 | End: 2020-07-20

## 2020-07-14 RX ADMIN — Medication 500 MILLIGRAM(S): at 05:55

## 2020-07-14 RX ADMIN — Medication 500 MILLIGRAM(S): at 13:43

## 2020-07-14 RX ADMIN — LOSARTAN POTASSIUM 100 MILLIGRAM(S): 100 TABLET, FILM COATED ORAL at 05:55

## 2020-07-14 RX ADMIN — Medication 12.5 MILLIGRAM(S): at 05:55

## 2020-07-14 RX ADMIN — Medication 1 TABLET(S): at 05:55

## 2020-07-14 NOTE — DISCHARGE NOTE PROVIDER - CARE PROVIDER_API CALL
Tamiko Fischer N  SURGERY  [address] [place] [address]  Phone: [phone]  Fax: [phone]  Follow Up Time: 2 weeks

## 2020-07-14 NOTE — DISCHARGE NOTE PROVIDER - NSDCCPTREATMENT_GEN_ALL_CORE_FT
PRINCIPAL PROCEDURE  Procedure: Complex incision and drainage of wound infection  Findings and Treatment:       SECONDARY PROCEDURE  Procedure: Wound VAC dressing change for area 50 sq cm or less  Findings and Treatment:

## 2020-07-14 NOTE — DISCHARGE NOTE PROVIDER - NSDCDCMDCOMP_GEN_ALL_CORE
Pulling at both ears with increased fussiness for past 2 days
This document is complete and the patient is ready for discharge.

## 2020-07-14 NOTE — DISCHARGE NOTE PROVIDER - NSDCCPCAREPLAN_GEN_ALL_CORE_FT
PRINCIPAL DISCHARGE DIAGNOSIS  Diagnosis: Leg pain, central, left  Assessment and Plan of Treatment:       SECONDARY DISCHARGE DIAGNOSES  Diagnosis: CKD (chronic kidney disease) stage 3, GFR 30-59 ml/min  Assessment and Plan of Treatment: CKD (chronic kidney disease) stage 3, GFR 30-59 ml/min    Diagnosis: HTN (hypertension)  Assessment and Plan of Treatment: HTN (hypertension)    Diagnosis: Anemia, normocytic normochromic  Assessment and Plan of Treatment: Anemia, normocytic normochromic    Diagnosis: PAD (peripheral artery disease)  Assessment and Plan of Treatment: ischemic ulcer

## 2020-07-14 NOTE — DISCHARGE NOTE PROVIDER - NSDCFUADDINST_GEN_ALL_CORE_FT
Follow-up with Dr. Fischer in 1-2 weeks in office at [phone].     Wound care:  Left thigh- V.A.C. wound care. Change 3 times per week at 125 mmHg.  IF VAC malfunctions, then dampen gauze with saline solution. Pack into wound. Cover with dry gauze and Kerlix wrap daily.   Do NOT bathe, swim, or hot tub until you are cleared by your doctor.     Please call your doctor if you have a fever of over 101.9 or swelling/bleeding at wound.

## 2020-07-14 NOTE — PROGRESS NOTE ADULT - REASON FOR ADMISSION
L surgical incision infection

## 2020-07-14 NOTE — DISCHARGE NOTE PROVIDER - NSDCMRMEDTOKEN_GEN_ALL_CORE_FT
acetaminophen 325 mg oral tablet: 2 tab(s) orally every 6 hours, As needed, Moderate Pain (4 - 6)  aspirin 81 mg oral tablet: 1 tab(s) orally once a day  atorvastatin 40 mg oral tablet: 1 tab(s) orally once a day (at bedtime)  Benicar HCT 40 mg-12.5 mg oral tablet: 1 tab(s) orally once a day  clopidogrel 75 mg oral tablet: 1 tab(s) orally once a day  metroNIDAZOLE 500 mg oral tablet: 1 tab(s) orally every 8 hours  oxycodone-acetaminophen 5 mg-325 mg oral tablet: 1 tab(s) orally every 6 hours, As needed, Severe Pain (7 - 10)  polyethylene glycol 3350 oral powder for reconstitution: 17 gram(s) orally once a day  senna oral tablet: 2 tab(s) orally once a day (at bedtime)  sulfamethoxazole-trimethoprim 800 mg-160 mg oral tablet: 1 tab(s) orally 2 times a day

## 2020-07-14 NOTE — PROGRESS NOTE ADULT - SUBJECTIVE AND OBJECTIVE BOX
Subjective:  patient interviewed with Kazakh . No acute overnight events. doing well this morning. No fevers or chills. Pain is well controlled. Tolerating diet well. Hopeful to go home today      MEDICATIONS  (STANDING):  aspirin enteric coated 81 milliGRAM(s) Oral daily  atorvastatin 40 milliGRAM(s) Oral at bedtime  clopidogrel Tablet 75 milliGRAM(s) Oral daily  heparin   Injectable 5000 Unit(s) SubCutaneous every 8 hours  hydrochlorothiazide 12.5 milliGRAM(s) Oral daily  losartan 100 milliGRAM(s) Oral daily  metroNIDAZOLE    Tablet 500 milliGRAM(s) Oral every 8 hours  polyethylene glycol 3350 17 Gram(s) Oral daily  senna 2 Tablet(s) Oral at bedtime  trimethoprim  160 mG/sulfamethoxazole 800 mG 1 Tablet(s) Oral two times a day    MEDICATIONS  (PRN):  acetaminophen   Tablet .. 650 milliGRAM(s) Oral every 6 hours PRN Mild Pain (1 - 3), Moderate Pain (4 - 6)    Vital Signs Last 24 Hrs  T(C): 36.7 (14 Jul 2020 09:13), Max: 36.8 (13 Jul 2020 14:00)  T(F): 98.1 (14 Jul 2020 09:13), Max: 98.3 (13 Jul 2020 14:00)  HR: 62 (14 Jul 2020 08:16) (62 - 96)  BP: 136/65 (14 Jul 2020 08:16) (118/71 - 161/69)  BP(mean): 92 (14 Jul 2020 08:16) (88 - 101)  RR: 18 (14 Jul 2020 08:16) (14 - 18)  SpO2: 96% (14 Jul 2020 08:16) (94% - 100%)  PHYSICAL EXAM:    General: pleasant, appropriate, no acute distress. Participating appropriately in interview.  HEENT: NC/AT, MMM  Neck: soft, supple, no lymphadenopathy  Cardiac: regular rhythm, normal rate, normal s1/s2, no murmurs, rubs, or gallops  Lungs: clear to auscultation bilaterally without wheezes, rales, or rhonchi. Normal work of breathing. Speaking in complete sentences.  Abdomen: soft, nontender, nondistended. Bowel sounds present and normoactive.   Extremities: moving all extremities. No edema.  Neuro: awake, alert, oriented x4. Follows commands. Moving all extremities. Sensation intact.  Psych: no evidence of AVH.   Skin: R thigh with wound vac in place, no erythema    LABS:           07-13    144  |  111<H>  |  13  ----------------------------<  102<H>  4.2   |  23  |  0.92    Ca    9.4      13 Jul 2020 05:56  Phos  2.6     07-13  Mg     2.0     07-13                            9.9    7.19  )-----------( 242      ( 13 Jul 2020 05:56 )             31.6     Radiology: none new

## 2020-07-14 NOTE — PROGRESS NOTE ADULT - ASSESSMENT
80F with PMH of HTN, PAD (s/p LLE BK Pop to DP bypass with rGSV on 5/7/20) presents from outpatient office due to concern for an infected LLE incision now s/p debridement.

## 2020-07-14 NOTE — DISCHARGE NOTE NURSING/CASE MANAGEMENT/SOCIAL WORK - PATIENT PORTAL LINK FT
You can access the FollowMyHealth Patient Portal offered by [facility] by registering at the following website: [email or web address]. By joining [facility]’s FollowMyHealth portal, you will also be able to view your health information using other applications (apps) compatible with our system.

## 2020-07-14 NOTE — DISCHARGE NOTE PROVIDER - HOSPITAL COURSE
80F with PMH of HTN, PAD (s/p LLE BK Pop to DP bypass with rGSV on 5/7/20) presents from outpatient office due to concern for an infected LLE incision. Patient states her recovery had been fairly unremarkable with a return to normal function, as well as improvement in her LLE heel wound. About 1 week ago she and her  noticed green discharge coming out of her medial upper thigh incision that had a foul odor. She was seen by her PCP who prescribed her a course of Doxycycline. She was then seen in the vascular outpatient office and it was recommended that she come to the ED for admission for IV Abx and surgical I+D. She denies fevers/chills, CP/SOB, N/V, changes in bowel or urinary habits.        During hospital course started on IV vanc/zosyn.  COVID neg. On 7/10 s/p I+D of L thigh abscess, wound vac placement. Wound cx grew morganella sensitive to zosyn, d/c'ed vanco, Pt recs home with no needs; started flagyl due to Bacteroides in culture and stopped zosyn and started bactrim. Her postoperative course was unremarkable with advancement of diet, passing trial of void, and pain control. On day of discharge patient was stable to be d/c'd home with home vac and bactrim and flagyl x7days.

## 2020-07-15 PROCEDURE — 83735 ASSAY OF MAGNESIUM: CPT

## 2020-07-15 PROCEDURE — 80048 BASIC METABOLIC PNL TOTAL CA: CPT

## 2020-07-15 PROCEDURE — 73700 CT LOWER EXTREMITY W/O DYE: CPT

## 2020-07-15 PROCEDURE — 85610 PROTHROMBIN TIME: CPT

## 2020-07-15 PROCEDURE — 88304 TISSUE EXAM BY PATHOLOGIST: CPT

## 2020-07-15 PROCEDURE — 36415 COLL VENOUS BLD VENIPUNCTURE: CPT

## 2020-07-15 PROCEDURE — 87635 SARS-COV-2 COVID-19 AMP PRB: CPT

## 2020-07-15 PROCEDURE — 86850 RBC ANTIBODY SCREEN: CPT

## 2020-07-15 PROCEDURE — 80053 COMPREHEN METABOLIC PANEL: CPT

## 2020-07-15 PROCEDURE — 87070 CULTURE OTHR SPECIMN AEROBIC: CPT

## 2020-07-15 PROCEDURE — 86769 SARS-COV-2 COVID-19 ANTIBODY: CPT

## 2020-07-15 PROCEDURE — 99285 EMERGENCY DEPT VISIT HI MDM: CPT | Mod: 25

## 2020-07-15 PROCEDURE — 96375 TX/PRO/DX INJ NEW DRUG ADDON: CPT

## 2020-07-15 PROCEDURE — 97162 PT EVAL MOD COMPLEX 30 MIN: CPT

## 2020-07-15 PROCEDURE — 85025 COMPLETE CBC W/AUTO DIFF WBC: CPT

## 2020-07-15 PROCEDURE — 85027 COMPLETE CBC AUTOMATED: CPT

## 2020-07-15 PROCEDURE — 87075 CULTR BACTERIA EXCEPT BLOOD: CPT

## 2020-07-15 PROCEDURE — 87040 BLOOD CULTURE FOR BACTERIA: CPT

## 2020-07-15 PROCEDURE — 96365 THER/PROPH/DIAG IV INF INIT: CPT

## 2020-07-15 PROCEDURE — 87186 SC STD MICRODIL/AGAR DIL: CPT

## 2020-07-15 PROCEDURE — 93005 ELECTROCARDIOGRAM TRACING: CPT

## 2020-07-15 PROCEDURE — 85730 THROMBOPLASTIN TIME PARTIAL: CPT

## 2020-07-15 PROCEDURE — 86901 BLOOD TYPING SEROLOGIC RH(D): CPT

## 2020-07-15 PROCEDURE — U0003: CPT

## 2020-07-15 PROCEDURE — 84100 ASSAY OF PHOSPHORUS: CPT

## 2020-07-15 PROCEDURE — 71045 X-RAY EXAM CHEST 1 VIEW: CPT

## 2020-07-17 DIAGNOSIS — T81.42XA INFECTION FOLLOWING A PROCEDURE, DEEP INCISIONAL SURGICAL SITE, INITIAL ENCOUNTER: ICD-10-CM

## 2020-07-17 DIAGNOSIS — D64.9 ANEMIA, UNSPECIFIED: ICD-10-CM
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
## 2020-07-17 DIAGNOSIS — Z79.82 LONG TERM (CURRENT) USE OF ASPIRIN: ICD-10-CM

## 2020-07-17 DIAGNOSIS — Z79.899 OTHER LONG TERM (CURRENT) DRUG THERAPY: ICD-10-CM

## 2020-07-17 DIAGNOSIS — B96.4 PROTEUS (MIRABILIS) (MORGANII) AS THE CAUSE OF DISEASES CLASSIFIED ELSEWHERE: ICD-10-CM

## 2020-07-17 DIAGNOSIS — I12.9 HYPERTENSIVE CHRONIC KIDNEY DISEASE WITH STAGE 1 THROUGH STAGE 4 CHRONIC KIDNEY DISEASE, OR UNSPECIFIED CHRONIC KIDNEY DISEASE: ICD-10-CM

## 2020-07-17 DIAGNOSIS — I73.9 PERIPHERAL VASCULAR DISEASE, UNSPECIFIED: ICD-10-CM

## 2020-07-17 DIAGNOSIS — L02.416 CUTANEOUS ABSCESS OF LEFT LOWER LIMB: ICD-10-CM
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
## 2020-07-17 DIAGNOSIS — N18.3 CHRONIC KIDNEY DISEASE, STAGE 3 (MODERATE): ICD-10-CM

## 2020-08-07 ENCOUNTER — APPOINTMENT (OUTPATIENT)
Dept: VASCULAR SURGERY | Facility: CLINIC | Age: 80
End: 2020-08-07
Payer: MEDICARE

## 2020-08-07 PROCEDURE — 97606 NEG PRS WND THER DME>50 SQCM: CPT

## 2020-08-07 PROCEDURE — 99213 OFFICE O/P EST LOW 20 MIN: CPT | Mod: 25

## 2020-08-12 NOTE — PROCEDURE
[FreeTextEntry1] : Cleaned wound with peroxide and dried thoroughly. Patient didn't bring wound vac suppliees, therefore wet to dry dressing was applied\par

## 2020-08-12 NOTE — ADDENDUM
[FreeTextEntry1] : This note was written by Amanda Aguiar on 08/07/2020 acting as scribe for Tamiko Mauricio M.D.\par \par I, Tamiko Mauricio have read and attest that all the information, medical decision making and discharge instructions within are true and accurate.

## 2020-08-12 NOTE — ASSESSMENT
[Arterial/Venous Disease] : arterial/venous disease [Ulcer Care] : ulcer care [FreeTextEntry1] : 80 year old with PMHx of HTN, PAD s/p LLE BK POP to DP bypass with rGSV on 5/2020, followed by thigh incision dehiscence and readmission for IV Abx's and I&D. Wound vac was applied and today she returns for a f/u. Patient with a small area of  left thigh wound that has frankly improved now with good granulation tissue and no signs of infection. I have discussed at great length the patient should continue with her wound vac . I have reassured her that  the ulcer will heal further.

## 2020-08-12 NOTE — PHYSICAL EXAM
[2+] : left 2+ [Ankle Swelling (On Exam)] : present [1+] : left 1+ [Ankle Swelling On The Left] : moderate [Alert] : alert [Calm] : calm [Varicose Veins Of Lower Extremities] : not present [JVD] : no jugular venous distention  [Abdomen Tenderness] : ~T ~M No abdominal tenderness [] : not present [de-identified] : WN/WD, NAD [de-identified] : LLE: 3 x 4 cm ulcer to the medial aspect of the left thigh with no purulent drainage, presence of granulation tissue with no surrounding erythema, edema or signs of infection.  [de-identified] : NC/AT

## 2020-08-12 NOTE — HISTORY OF PRESENT ILLNESS
[FreeTextEntry1] : 80 year old with PMHx of HTN, PAD s/p LLE BK POP to DP bypass with rGSV on 5/2020 . On 7/9/2020  she and her  noticed green discharge coming out of her medial upper thigh incision that had a foul odor. She was then seen here and she was admitted for IV Abx and surgical I+D.  On 7/10 s/p I+D of \par L thigh abscess, wound vac placement. Today she returns for a f/u. She is doing well, denies pain, fever, chills, difficulty ambulating.\par \par She is accompanied by her .

## 2020-08-20 NOTE — PATIENT PROFILE ADULT - DISASTER - FUNCTIONAL SCREEN CURRENT LEVEL: COMMUNICATION, MLM
Eliquis with hx of CVA  Digoxin, metoprolol  Monitor platelets     0 = understands/communicates without difficulty

## 2020-09-04 ENCOUNTER — APPOINTMENT (OUTPATIENT)
Dept: VASCULAR SURGERY | Facility: CLINIC | Age: 80
End: 2020-09-04
Payer: MEDICARE

## 2020-09-04 DIAGNOSIS — T81.31XD DISRUPTION OF EXTERNAL OPERATION (SURGICAL) WOUND, NOT ELSEWHERE CLASSIFIED, SUBSEQUENT ENCOUNTER: ICD-10-CM

## 2020-09-04 PROCEDURE — 99213 OFFICE O/P EST LOW 20 MIN: CPT

## 2020-09-04 NOTE — PROCEDURE
[FreeTextEntry1] : Wound care:wound cleaned w/Hydrogen peroxide, applied bacitracin and cover wound with dry gauze.

## 2020-09-04 NOTE — HISTORY OF PRESENT ILLNESS
[FreeTextEntry1] : 80 year old with PMHx of HTN, PAD s/p LLE BK POP to DP bypass with rGSV on 5/2020 . Today she returns for a f/u She is doing well, she is no longer with  a wound VAC and wound has improve smaller in size, her  continues to clean patient's wound with wet and dry dressings. She denies pain, fever, chills, difficulty ambulating.\par \par

## 2020-09-04 NOTE — ADDENDUM
[FreeTextEntry1] : This note was written by Amanda Aguiar on 09/04/2020 acting as scribe for LILY Villanueva.\par \par I, LILY Villanueva have read and attest that all the information, medical decision making and discharge instructions within are true and accurate.

## 2020-09-04 NOTE — ASSESSMENT
[FreeTextEntry1] : 80 year old with PAD s/p LLE BK POP to DP bypass with rGSV on 5/2020, followed by thigh incision dehiscence and readmission for IV Abx's and I&D. Today she returns for a f/u. Patient with a small ulcer to the left thigh, no longer with wound VAC , on examination wound frankly improved now with good granulation tissue and no signs of infection.Patient recommended daily showers with soap and water, apply bacitracin to the wound and patient will f/u with us in 2 months.  [Arterial/Venous Disease] : arterial/venous disease [Ulcer Care] : ulcer care

## 2020-09-04 NOTE — PHYSICAL EXAM
[Normal Rate and Rhythm] : normal rate and rhythm [2+] : left 2+ [1+] : left 1+ [Ankle Swelling (On Exam)] : present [Ankle Swelling On The Left] : moderate [Alert] : alert [Oriented to Person] : oriented to person [Oriented to Place] : oriented to place [Oriented to Time] : oriented to time [Calm] : calm [JVD] : no jugular venous distention  [Varicose Veins Of Lower Extremities] : not present [] : not present [Abdomen Tenderness] : ~T ~M No abdominal tenderness [de-identified] : WN/WD, NAD [de-identified] : NC/AT [de-identified] : LLE: small healing ulcer to the medial aspect of the left thigh with no purulent drainage, presence of granulation tissue with no surrounding erythema, edema or signs of infection.

## 2020-09-04 NOTE — REVIEW OF SYSTEMS
[As Noted in HPI] : as noted in HPI [Skin Wound] : skin wound [Negative] : Heme/Lymph [Fever] : no fever [Chills] : no chills [Leg Claudication] : no intermittent leg claudication

## 2020-11-23 ENCOUNTER — APPOINTMENT (OUTPATIENT)
Dept: VASCULAR SURGERY | Facility: CLINIC | Age: 80
End: 2020-11-23
Payer: MEDICARE

## 2020-11-23 PROCEDURE — 93926 LOWER EXTREMITY STUDY: CPT

## 2020-11-23 PROCEDURE — 99215 OFFICE O/P EST HI 40 MIN: CPT

## 2020-11-24 NOTE — HISTORY OF PRESENT ILLNESS
[FreeTextEntry1] : 81 y/o F w/ PMHx of HTN,80 year old with PAD s/p LLE BK POP to DP bypass with rGSV on 5/2020, followed by L thigh abscess drainage and excisional debridement of necrotic tissue with wound VAC on 7/10, wound vac removed 9/4/2020. Today, she reports feeling very well, she has several questions regarding her AC therapy duration, LLE swelling and incision hypopigmentation.  \par Denies: fevers, chills, skin changes, elevation pallor. \par \par Pt accompanied by her .

## 2020-11-24 NOTE — PHYSICAL EXAM
[Respiratory Effort] : normal respiratory effort [Normal Rate and Rhythm] : normal rate and rhythm [2+] : left 2+ [1+] : left 1+ [Ankle Swelling (On Exam)] : present [Ankle Swelling On The Left] : of the left ankle [Alert] : alert [Oriented to Person] : oriented to person [Oriented to Place] : oriented to place [Oriented to Time] : oriented to time [Calm] : calm [Ankle Swelling On The Right] : mild [JVD] : no jugular venous distention  [Varicose Veins Of Lower Extremities] : not present [] : not present [Abdomen Tenderness] : ~T ~M No abdominal tenderness [de-identified] : WN/WD, NAD [de-identified] : NC/AT [de-identified] : LLE: small ulcer to the medial aspect of the left thigh healed, midline incision from medial thigh to the calf healed.  [de-identified] : Feet are pink, toes are warm to touch with adequate capillary refill.

## 2020-11-24 NOTE — ASSESSMENT
[FreeTextEntry1] : 80 year old with PAD s/p LLE BK POP to DP bypass with rGSV on 5/2020, followed by L thigh abscess drainage and excisional debridement of necrotic tissue with wound VAC on 7/10, wound vac removed 9/4/2020 presents for f/u. On exam, LLE swelling improved LLE: small ulcer to the medial aspect of the left thigh healed, midline incision from medial thigh to the calf healed. Feet are pink, toes are warm to touch with adequate capillary refill. \par LLE duplex revealed patent femoral to DP bypass.  \par Regarding AC therapy to continue ~ 6 months to be further discussed  during next evaluation, pt recommended to allow time for her LLE incision hypopigmentation/ swelling will continue to improve.\par Pt advised to continue Plavix and to f/u with us here with repeat scans in 6 months

## 2020-11-24 NOTE — ADDENDUM
[FreeTextEntry1] : This note was written by Amanda Aguiar on 11/23/2020 acting as scribe for Tamiko Coronel M.D.\par \par I, Tamiko Mauricio have read and attest that all the information, medical decision making and discharge instructions within are true and accurate.

## 2020-11-24 NOTE — PROCEDURE
[FreeTextEntry1] : LLE duplex done: Patent femoral to DP vein bypass graft with no evidence of flow restrictive lesions.

## 2021-01-25 RX ORDER — CLOPIDOGREL BISULFATE 75 MG/1
75 TABLET, FILM COATED ORAL
Qty: 30 | Refills: 5 | Status: ACTIVE | COMMUNITY
Start: 2020-05-27 | End: 1900-01-01

## 2021-05-17 ENCOUNTER — APPOINTMENT (OUTPATIENT)
Dept: VASCULAR SURGERY | Facility: CLINIC | Age: 81
End: 2021-05-17
Payer: MEDICARE

## 2021-05-17 PROCEDURE — 99213 OFFICE O/P EST LOW 20 MIN: CPT

## 2021-05-17 PROCEDURE — 93926 LOWER EXTREMITY STUDY: CPT

## 2021-05-20 NOTE — PHYSICAL EXAM
[Respiratory Effort] : normal respiratory effort [Normal Rate and Rhythm] : normal rate and rhythm [2+] : left 2+ [1+] : left 1+ [Ankle Swelling (On Exam)] : present [Ankle Swelling On The Left] : of the left ankle [Ankle Swelling On The Right] : mild [Alert] : alert [Oriented to Person] : oriented to person [Oriented to Place] : oriented to place [Oriented to Time] : oriented to time [Calm] : calm [JVD] : no jugular venous distention  [Varicose Veins Of Lower Extremities] : not present [] : not present [Abdomen Tenderness] : ~T ~M No abdominal tenderness [de-identified] : WN/WD, NAD [de-identified] : NC/AT [de-identified] : FROM [de-identified] : LLE: well healed L thigh incision to the calf with no skin pigmentation changes. No skin breakdown.  Toes are warm to touch with adequate capillary refill.

## 2021-05-20 NOTE — PROCEDURE
[FreeTextEntry1] : I ordered a LLE duplex completed in the office today showing patent femoral to DP bypass.

## 2021-05-20 NOTE — ADDENDUM
[FreeTextEntry1] : This note was written by Amanda Aguiar on 05/17/2021 acting as scribe for Tamiko Coronel M.D.\par \par I, Tamiko Mauricio have read and attest that all the information, medical decision making and discharge instructions within are true and accurate.

## 2021-05-20 NOTE — HISTORY OF PRESENT ILLNESS
[FreeTextEntry1] : 80 y/o F w/ PMHx of HTN,80 year old with PAD s/p LLE BK POP to DP bypass with rGSV on 5/2020, followed by L thigh abscess drainage and excisional debridement of necrotic tissue with wound VAC on 7/10, wound vac removed 9/4/2020 presents for 6 month follow up on LLE below the knee popliteal to DP bypass. Today, she reports feeling very well. She would like to know should she continue long life AC therapy. She has been staying active and walking around the house.  Denies: fevers, chills, skin changes, elevation pallor. \par \par Pt accompanied by her .

## 2021-11-15 ENCOUNTER — APPOINTMENT (OUTPATIENT)
Dept: VASCULAR SURGERY | Facility: CLINIC | Age: 81
End: 2021-11-15
Payer: MEDICARE

## 2021-11-15 VITALS
BODY MASS INDEX: 28.79 KG/M2 | WEIGHT: 190 LBS | DIASTOLIC BLOOD PRESSURE: 80 MMHG | HEIGHT: 68 IN | HEART RATE: 85 BPM | SYSTOLIC BLOOD PRESSURE: 117 MMHG

## 2021-11-15 DIAGNOSIS — I73.9 PERIPHERAL VASCULAR DISEASE, UNSPECIFIED: ICD-10-CM

## 2021-11-15 PROCEDURE — 93926 LOWER EXTREMITY STUDY: CPT

## 2021-11-15 PROCEDURE — 99213 OFFICE O/P EST LOW 20 MIN: CPT

## 2021-11-18 NOTE — ASSESSMENT
[FreeTextEntry1] : 81yoF w/PMHx of HTN, PAD s/p LLE BK POP to DP bypass with rGSV on 5/2020, followed by L thigh abscess drainage and excisional debridement of necrotic tissue with wound VAC on 7/10, wound vac removed 9/4/2020 presents for 6 month follow up on LLE below the knee popliteal to DP bypass. Pt reports no issues w/the LE in regards to claudication or tissue loss/infection.\par \par On exam, LLE well-perfused and no evidence of infection or ischemia.  LLE duplex performed to evaluate for stent and bypass patency reveals widely patent L dSFA stent and patent fem-CLEMENT bypass w/rSVG

## 2021-11-18 NOTE — REVIEW OF SYSTEMS
[Fever] : no fever [Chills] : no chills [Leg Claudication] : no intermittent leg claudication [As Noted in HPI] : as noted in HPI [Skin Wound] : skin wound [Negative] : Heme/Lymph

## 2021-11-18 NOTE — PROCEDURE
[FreeTextEntry1] : LLE duplex performed to evaluate for stent and bypass patency reveals widely patent L dSFA stent and patent fem-CLEMENT bypass w/rSVG

## 2021-11-18 NOTE — PHYSICAL EXAM
[JVD] : no jugular venous distention  [Respiratory Effort] : normal respiratory effort [Normal Rate and Rhythm] : normal rate and rhythm [2+] : left 2+ [1+] : left 1+ [Ankle Swelling (On Exam)] : present [Ankle Swelling On The Left] : of the left ankle [Ankle Swelling On The Right] : mild [Varicose Veins Of Lower Extremities] : not present [] : not present [Abdomen Tenderness] : ~T ~M No abdominal tenderness [Alert] : alert [Oriented to Person] : oriented to person [Oriented to Place] : oriented to place [Oriented to Time] : oriented to time [Calm] : calm [de-identified] : WN/WD, NAD [de-identified] : NC/AT [de-identified] : FROM [de-identified] : LLE: well healed L thigh incision to the calf with no skin pigmentation changes. No skin breakdown.  Toes are warm to touch with adequate capillary refill.

## 2021-11-18 NOTE — HISTORY OF PRESENT ILLNESS
[FreeTextEntry1] : 81yoF w/PMHx of HTN, PAD s/p LLE BK POP to DP bypass with rGSV on 5/2020, followed by L thigh abscess drainage and excisional debridement of necrotic tissue with wound VAC on 7/10, wound vac removed 9/4/2020 presents for 6 month follow up on LLE below the knee popliteal to DP bypass. Pt reports no issues w/the LE in regards to claudication or tissue loss/infection.

## 2022-05-16 ENCOUNTER — APPOINTMENT (OUTPATIENT)
Dept: VASCULAR SURGERY | Facility: CLINIC | Age: 82
End: 2022-05-16
Payer: MEDICARE

## 2022-05-16 PROCEDURE — 93926 LOWER EXTREMITY STUDY: CPT

## 2022-05-16 PROCEDURE — 99212 OFFICE O/P EST SF 10 MIN: CPT

## 2022-05-18 NOTE — HISTORY OF PRESENT ILLNESS
[FreeTextEntry1] : 82yoF, previously under care of DR. Fischer w/PMHx of HTN, PAD s/p LLE BK POP to DP bypass with rGSV on 5/2020, followed by L thigh abscess drainage and excisional debridement of necrotic tissue with wound VAC on 7/10, wound vac removed 9/4/2020 presents for 6 month follow up on LLE below the knee popliteal to DP bypass. Today, she reports feeling very well. She denies pain, fever, chills, difficulty ambulating.\par \par

## 2022-05-18 NOTE — ADDENDUM
[FreeTextEntry1] : I, Dr. Eric Dalal, personally performed the evaluation and management (E/M) services for this established patient who presents today with (an) existing condition(s).  That E/M includes conducting the examination, assessing all conditions, and (re)establishing/reinforcing a plan of care.  Today, my ACP, Felicia TRACEY, was here to observe my evaluation and management services for this condition to be followed going forward.\par \par \par \par \par \par \par The documentation for this encounter was entered by Yvette Tan acting as scribe for Dr. Eric Dalal.\par

## 2022-05-18 NOTE — ASSESSMENT
[Arterial/Venous Disease] : arterial/venous disease [FreeTextEntry1] : 82yoF, previously under care of DR. Fischer w/PMHx of HTN, PAD s/p LLE BK POP to DP bypass with rGSV on 5/2020, followed by L thigh abscess drainage and excisional debridement of necrotic tissue with wound VAC on 7/10, wound vac removed 9/4/2020 presents for 6 month follow up on LLE below the knee popliteal to DP bypass. Pt reports no issues w/the LE in regards to claudication or tissue loss/infection.\par \par On exam, LLE well-perfused and no evidence of infection or ischemia.  LLE duplex performed to evaluate for stent and bypass patency reveals widely patent L dSFA stent and patent fem-CLEMENT bypass w/rSVG\par Pt was recommended to stay active, walk and exercise.\par Patient to f/u in 6 months.

## 2022-05-18 NOTE — PHYSICAL EXAM
[Respiratory Effort] : normal respiratory effort [Normal Rate and Rhythm] : normal rate and rhythm [2+] : left 2+ [1+] : left 1+ [Ankle Swelling (On Exam)] : present [Ankle Swelling On The Left] : of the left ankle [Ankle Swelling On The Right] : mild [Alert] : alert [Oriented to Person] : oriented to person [Oriented to Place] : oriented to place [Oriented to Time] : oriented to time [Calm] : calm [JVD] : no jugular venous distention  [Varicose Veins Of Lower Extremities] : not present [] : not present [Abdomen Tenderness] : ~T ~M No abdominal tenderness [de-identified] : WN/WD, NAD [de-identified] : NC/AT [de-identified] : FROM [de-identified] : LLE: well healed L thigh incision to the calf with no skin pigmentation changes. No skin breakdown.  Toes are warm to touch with adequate capillary refill.

## 2022-11-21 ENCOUNTER — APPOINTMENT (OUTPATIENT)
Dept: VASCULAR SURGERY | Facility: CLINIC | Age: 82
End: 2022-11-21

## 2023-12-16 NOTE — REVIEW OF SYSTEMS
[As Noted in HPI] : as noted in HPI [Skin Wound] : skin wound [Negative] : Heme/Lymph [Fever] : no fever [Chills] : no chills [Leg Claudication] : no intermittent leg claudication COPD exacerbation

## 2025-03-19 NOTE — PROGRESS NOTE ADULT - PROBLEM SELECTOR PROBLEM 2
Post-Op Assessment Note    CV Status:  Stable    Pain management: adequate       Mental Status:  Alert and awake   Hydration Status:  Euvolemic   PONV Controlled:  Controlled   Airway Patency:  Patent     Post Op Vitals Reviewed: Yes    No anethesia notable event occurred.    Staff: Anesthesiologist           Last Filed PACU Vitals:  Vitals Value Taken Time   Temp 97 °F (36.1 °C) 03/19/25 1230   Pulse 62 03/19/25 1237   /85 03/19/25 1230   Resp 14 03/19/25 1237   SpO2 97 % 03/19/25 1237   Vitals shown include unfiled device data.    Modified Mazin:     Vitals Value Taken Time   Activity 2 03/19/25 1230   Respiration 2 03/19/25 1230   Circulation 2 03/19/25 1230   Consciousness 2 03/19/25 1230   Oxygen Saturation 2 03/19/25 1230     Modified Mazin Score: 10             PAD (peripheral artery disease)
